# Patient Record
Sex: MALE | Race: WHITE | Employment: UNEMPLOYED | URBAN - METROPOLITAN AREA
[De-identification: names, ages, dates, MRNs, and addresses within clinical notes are randomized per-mention and may not be internally consistent; named-entity substitution may affect disease eponyms.]

---

## 2021-04-13 NOTE — PRE-PROCEDURE INSTRUCTIONS
Pre-procedure phone call was made to patient and there was no answer. Detailed message was left for patient.   [phone]

## 2021-04-19 ENCOUNTER — ANESTHESIA (OUTPATIENT)
Dept: ENDOSCOPY | Age: 61
End: 2021-04-19
Payer: MEDICARE

## 2021-04-19 ENCOUNTER — HOSPITAL ENCOUNTER (OUTPATIENT)
Age: 61
Setting detail: OUTPATIENT SURGERY
Discharge: HOME | End: 2021-04-19
Attending: INTERNAL MEDICINE | Admitting: INTERNAL MEDICINE
Payer: MEDICARE

## 2021-04-19 ENCOUNTER — ANESTHESIA EVENT (OUTPATIENT)
Dept: ENDOSCOPY | Age: 61
End: 2021-04-19
Payer: MEDICARE

## 2021-04-19 VITALS
RESPIRATION RATE: 16 BRPM | SYSTOLIC BLOOD PRESSURE: 131 MMHG | DIASTOLIC BLOOD PRESSURE: 77 MMHG | HEART RATE: 64 BPM | OXYGEN SATURATION: 95 % | TEMPERATURE: 98 F

## 2021-04-19 VITALS — OXYGEN SATURATION: 100 % | DIASTOLIC BLOOD PRESSURE: 75 MMHG | SYSTOLIC BLOOD PRESSURE: 92 MMHG

## 2021-04-19 LAB
GLUCOSE BLD-MCNC: 300 MG/DL (ref 70–99)
PERFORMED ON: ABNORMAL
SARS-COV-2, NAAT: NOT DETECTED

## 2021-04-19 PROCEDURE — 7100000010 HC PHASE II RECOVERY - FIRST 15 MIN: Performed by: INTERNAL MEDICINE

## 2021-04-19 PROCEDURE — 2500000003 HC RX 250 WO HCPCS: Performed by: NURSE ANESTHETIST, CERTIFIED REGISTERED

## 2021-04-19 PROCEDURE — 3609012400 HC EGD TRANSORAL BIOPSY SINGLE/MULTIPLE: Performed by: INTERNAL MEDICINE

## 2021-04-19 PROCEDURE — 88305 TISSUE EXAM BY PATHOLOGIST: CPT

## 2021-04-19 PROCEDURE — 7100000011 HC PHASE II RECOVERY - ADDTL 15 MIN: Performed by: INTERNAL MEDICINE

## 2021-04-19 PROCEDURE — 2580000003 HC RX 258: Performed by: ANESTHESIOLOGY

## 2021-04-19 PROCEDURE — 2709999900 HC NON-CHARGEABLE SUPPLY: Performed by: INTERNAL MEDICINE

## 2021-04-19 PROCEDURE — 3700000000 HC ANESTHESIA ATTENDED CARE: Performed by: INTERNAL MEDICINE

## 2021-04-19 PROCEDURE — 3609008400 HC SIGMOIDOSCOPY DIAGNOSTIC: Performed by: INTERNAL MEDICINE

## 2021-04-19 PROCEDURE — 87635 SARS-COV-2 COVID-19 AMP PRB: CPT

## 2021-04-19 PROCEDURE — 3700000001 HC ADD 15 MINUTES (ANESTHESIA): Performed by: INTERNAL MEDICINE

## 2021-04-19 PROCEDURE — 6360000002 HC RX W HCPCS: Performed by: NURSE ANESTHETIST, CERTIFIED REGISTERED

## 2021-04-19 RX ORDER — SODIUM CHLORIDE 9 MG/ML
INJECTION, SOLUTION INTRAVENOUS CONTINUOUS
Status: DISCONTINUED | OUTPATIENT
Start: 2021-04-19 | End: 2021-04-19 | Stop reason: HOSPADM

## 2021-04-19 RX ORDER — ACETAMINOPHEN 500 MG
1000 TABLET ORAL EVERY 12 HOURS PRN
COMMUNITY

## 2021-04-19 RX ORDER — SPIRONOLACTONE 25 MG/1
25 TABLET ORAL DAILY
COMMUNITY

## 2021-04-19 RX ORDER — GLYCOPYRROLATE 0.2 MG/ML
INJECTION INTRAMUSCULAR; INTRAVENOUS PRN
Status: DISCONTINUED | OUTPATIENT
Start: 2021-04-19 | End: 2021-04-19 | Stop reason: SDUPTHER

## 2021-04-19 RX ORDER — NICOTINE 21 MG/24HR
1 PATCH, TRANSDERMAL 24 HOURS TRANSDERMAL EVERY 24 HOURS
Status: ON HOLD | COMMUNITY
End: 2022-06-19

## 2021-04-19 RX ORDER — NITROGLYCERIN 0.4 MG/1
0.4 TABLET SUBLINGUAL EVERY 5 MIN PRN
Status: ON HOLD | COMMUNITY
End: 2022-06-19

## 2021-04-19 RX ORDER — DULOXETIN HYDROCHLORIDE 30 MG/1
30 CAPSULE, DELAYED RELEASE ORAL 2 TIMES DAILY
COMMUNITY

## 2021-04-19 RX ORDER — FAMOTIDINE 40 MG/1
40 TABLET, FILM COATED ORAL DAILY
Status: ON HOLD | COMMUNITY
End: 2022-06-19

## 2021-04-19 RX ORDER — ATORVASTATIN CALCIUM 80 MG/1
80 TABLET, FILM COATED ORAL NIGHTLY
COMMUNITY

## 2021-04-19 RX ORDER — TADALAFIL 5 MG/1
5 TABLET ORAL PRN
COMMUNITY

## 2021-04-19 RX ORDER — FINASTERIDE 5 MG/1
5 TABLET, FILM COATED ORAL DAILY
Status: ON HOLD | COMMUNITY
End: 2022-06-19

## 2021-04-19 RX ORDER — PROPOFOL 10 MG/ML
INJECTION, EMULSION INTRAVENOUS PRN
Status: DISCONTINUED | OUTPATIENT
Start: 2021-04-19 | End: 2021-04-19 | Stop reason: SDUPTHER

## 2021-04-19 RX ORDER — CLOPIDOGREL BISULFATE 75 MG/1
75 TABLET ORAL DAILY
COMMUNITY

## 2021-04-19 RX ORDER — PREGABALIN 150 MG/1
150 CAPSULE ORAL 2 TIMES DAILY
COMMUNITY

## 2021-04-19 RX ORDER — ASPIRIN 81 MG/1
81 TABLET ORAL DAILY
COMMUNITY

## 2021-04-19 RX ORDER — METOPROLOL SUCCINATE 50 MG/1
50 TABLET, EXTENDED RELEASE ORAL DAILY
COMMUNITY

## 2021-04-19 RX ORDER — LISINOPRIL AND HYDROCHLOROTHIAZIDE 25; 20 MG/1; MG/1
1 TABLET ORAL DAILY
Status: ON HOLD | COMMUNITY
End: 2022-06-19

## 2021-04-19 RX ORDER — TERBINAFINE HYDROCHLORIDE 250 MG/1
250 TABLET ORAL DAILY
Status: ON HOLD | COMMUNITY
End: 2022-06-19

## 2021-04-19 RX ORDER — LIDOCAINE HYDROCHLORIDE 20 MG/ML
INJECTION, SOLUTION EPIDURAL; INFILTRATION; INTRACAUDAL; PERINEURAL PRN
Status: DISCONTINUED | OUTPATIENT
Start: 2021-04-19 | End: 2021-04-19 | Stop reason: SDUPTHER

## 2021-04-19 RX ADMIN — PROPOFOL 50 MG: 10 INJECTION, EMULSION INTRAVENOUS at 12:18

## 2021-04-19 RX ADMIN — PROPOFOL 30 MG: 10 INJECTION, EMULSION INTRAVENOUS at 12:28

## 2021-04-19 RX ADMIN — SODIUM CHLORIDE: 9 INJECTION, SOLUTION INTRAVENOUS at 12:14

## 2021-04-19 RX ADMIN — PHENYLEPHRINE HYDROCHLORIDE 100 MCG: 10 INJECTION INTRAVENOUS at 12:26

## 2021-04-19 RX ADMIN — LIDOCAINE HYDROCHLORIDE 80 MG: 20 INJECTION, SOLUTION EPIDURAL; INFILTRATION; INTRACAUDAL; PERINEURAL at 12:18

## 2021-04-19 RX ADMIN — GLYCOPYRROLATE 0.2 MG: 0.2 INJECTION INTRAMUSCULAR; INTRAVENOUS at 12:18

## 2021-04-19 RX ADMIN — PROPOFOL 30 MG: 10 INJECTION, EMULSION INTRAVENOUS at 12:21

## 2021-04-19 RX ADMIN — SODIUM CHLORIDE: 9 INJECTION, SOLUTION INTRAVENOUS at 11:55

## 2021-04-19 RX ADMIN — SODIUM CHLORIDE: 9 INJECTION, SOLUTION INTRAVENOUS at 12:06

## 2021-04-19 RX ADMIN — PHENYLEPHRINE HYDROCHLORIDE 100 MCG: 10 INJECTION INTRAVENOUS at 12:30

## 2021-04-19 RX ADMIN — PROPOFOL 30 MG: 10 INJECTION, EMULSION INTRAVENOUS at 12:24

## 2021-04-19 ASSESSMENT — PAIN SCALES - GENERAL
PAINLEVEL_OUTOF10: 0

## 2021-04-19 ASSESSMENT — LIFESTYLE VARIABLES: SMOKING_STATUS: 1

## 2021-04-19 NOTE — H&P
Gastroenterology Note                 Pre-operative History and Physical    Patient: Wolfgang Guerin  : 1960  CSN:     History Obtained From:   Patient or guardian. HISTORY OF PRESENT ILLNESS:    The patient is a 63 y.o. male here for Endoscopy. Past Medical History:    Past Medical History:   Diagnosis Date    CAD (coronary artery disease)     [facility]     Empyema [facility]     GERD (gastroesophageal reflux disease)     Hyperlipidemia     Hypertension     Low back pain     [facility]myocardial infarction) [facility]     PVD (peripheral vascular disease) [facility]     Sleep apnea     TIA (transient ischemic attack)      Past Surgical History:    Past Surgical History:   Procedure Laterality Date    BACK INJECTION      CORONARY ARTERY BYPASS GRAFT      OTHER SURGICAL HISTORY      right leg vascular stents    TOE AMPUTATION Right      Medications Prior to Admission:   No current facility-administered medications on file prior to encounter. Current Outpatient Medications on File Prior to Encounter   Medication Sig Dispense Refill    lisinopril-hydroCHLOROthiazide (ZESTORETIC) 20-25 MG per tablet Take 1 tablet by mouth daily      metoprolol succinate (TOPROL XL) 100 MG extended release tablet Take 100 mg by mouth daily      pregabalin (LYRICA) 150 MG capsule Take 300 mg by mouth 2 times daily.       spironolactone (ALDACTONE) 25 MG tablet Take 25 mg by mouth daily      tadalafil (CIALIS) 5 MG tablet Take 5 mg by mouth as needed for Erectile Dysfunction      terbinafine (LAMISIL) 250 MG tablet Take 250 mg by mouth daily      diphenhydrAMINE HCl (BENADRYL PO) Take by mouth      acetaminophen (TYLENOL) 500 MG tablet Take 500 mg by mouth every 6 hours as needed for Pain      aspirin 81 MG EC tablet Take 81 mg by mouth daily      atorvastatin (LIPITOR) 80 MG tablet Take 80 mg by mouth daily      DULoxetine (CYMBALTA) 30 MG extended release capsule Take 30 mg by mouth daily      famotidine (PEPCID) 40 MG tablet Take 40 mg by mouth daily      finasteride (PROSCAR) 5 MG tablet Take 5 mg by mouth daily      Insulin Aspart Prot & Aspart (NOVOLOG MIX 70/30 FLEXPEN SC) Inject 10 Units into the skin 3 times daily Plus sliding scale      nicotine (NICODERM CQ) 21 MG/24HR Place 1 patch onto the skin every 24 hours      nitroGLYCERIN (NITROSTAT) 0.4 MG SL tablet Place 0.4 mg under the tongue every 5 minutes as needed for Chest pain up to max of 3 total doses. If no relief after 1 dose, call 911.  clopidogrel (PLAVIX) 75 MG tablet Take 75 mg by mouth daily          Allergies:  Amiodarone and Bactrim [sulfamethoxazole-trimethoprim]      Social History:   Social History     Tobacco Use    Smoking status: Current Every Day Smoker     Packs/day: 2.00     Years: 40.00     Pack years: 80.00    Smokeless tobacco: Never Used   Substance Use Topics    Alcohol use: Not Currently     Family History:   History reviewed. No pertinent family history. PHYSICAL EXAM:      There were no vitals taken for this visit. I        Heart:  RRR, normal s1s2    Lungs:  CTA and normal effort    Abdomen:   Soft, nt nd. ASSESSMENT AND PLAN:    1. Patient is a 63 y.o. male here for endoscopy with MAC sedation. 2.  Procedure options, risks and benefits reviewed with patient and/or guardian. They express understanding.

## 2021-04-19 NOTE — PROGRESS NOTES
Patient transported via wheelchair to lobby with spouse at side to wait for transportation home. Spouse has been on hold approximately 45 minutes waiting to make reservation for transportation home. Spouse will continue to try to contact transportation from waiting room. All patient care complete per protocol. Patient a/o x 4. No distress.

## 2021-04-19 NOTE — ANESTHESIA PRE PROCEDURE
Department of Anesthesiology  Preprocedure Note       Name:  Nicolas Francois   Age:  63 y.o.  :  1960                                          MRN:  [id number]         Date:  2021      Surgeon: Jelani Hoskins):  Sin Carlin MD    Procedure: Procedure(s):  COLONOSCOPY DIAGNOSTIC  EGD DIAGNOSTIC ONLY    Medications prior to admission:   Prior to Admission medications    Medication Sig Start Date End Date Taking? Authorizing Provider   lisinopril-hydroCHLOROthiazide (ZESTORETIC) 20-25 MG per tablet Take 1 tablet by mouth daily   Yes Historical Provider, MD   metoprolol succinate (TOPROL XL) 100 MG extended release tablet Take 100 mg by mouth daily   Yes Historical Provider, MD   pregabalin (LYRICA) 150 MG capsule Take 300 mg by mouth 2 times daily.    Yes Historical Provider, MD   spironolactone (ALDACTONE) 25 MG tablet Take 25 mg by mouth daily   Yes Historical Provider, MD   tadalafil (CIALIS) 5 MG tablet Take 5 mg by mouth as needed for Erectile Dysfunction   Yes Historical Provider, MD   terbinafine (LAMISIL) 250 MG tablet Take 250 mg by mouth daily   Yes Historical Provider, MD   diphenhydrAMINE HCl (BENADRYL PO) Take by mouth   Yes Historical Provider, MD   acetaminophen (TYLENOL) 500 MG tablet Take 500 mg by mouth every 6 hours as needed for Pain   Yes Historical Provider, MD   aspirin 81 MG EC tablet Take 81 mg by mouth daily   Yes Historical Provider, MD   atorvastatin (LIPITOR) 80 MG tablet Take 80 mg by mouth daily   Yes Historical Provider, MD   DULoxetine (CYMBALTA) 30 MG extended release capsule Take 30 mg by mouth daily   Yes Historical Provider, MD   famotidine (PEPCID) 40 MG tablet Take 40 mg by mouth daily   Yes Historical Provider, MD   finasteride (PROSCAR) 5 MG tablet Take 5 mg by mouth daily   Yes Historical Provider, MD   Insulin Aspart Prot & Aspart (NOVOLOG MIX 70/30 FLEXPEN SC) Inject 10 Units into the skin 3 times daily Plus sliding scale    Historical Provider, MD   nicotine (NICODERM CQ) 21 MG/24HR Place 1 patch onto the skin every 24 hours    Historical Provider, MD   nitroGLYCERIN (NITROSTAT) 0.4 MG SL tablet Place 0.4 mg under the tongue every 5 minutes as needed for Chest pain up to max of 3 total doses. If no relief after 1 dose, call 911. Historical Provider, MD   clopidogrel (PLAVIX) 75 MG tablet Take 75 mg by mouth daily    Historical Provider, MD       Current medications:    Current Facility-Administered Medications   Medication Dose Route Frequency Provider Last Rate Last Admin    0.9 % sodium chloride infusion   Intravenous Continuous Bolivar Gordon MD   New Bag at 04/19/21 1155       Allergies: Allergies   Allergen Reactions    Amiodarone     Bactrim [Sulfamethoxazole-Trimethoprim] Hives       Problem List:  There is no problem list on file for this patient. Past Medical History:  No past medical history on file. Past Surgical History:  No past surgical history on file. Social History:    Social History     Tobacco Use    Smoking status: Not on file   Substance Use Topics    Alcohol use: Not on file                                Counseling given: Not Answered      Vital Signs (Current): There were no vitals filed for this visit. BP Readings from Last 3 Encounters:   No data found for BP       NPO Status:                                                                                 BMI:   Wt Readings from Last 3 Encounters:   No data found for Wt     There is no height or weight on file to calculate BMI.    CBC: No results found for: WBC, RBC, HGB, HCT, MCV, RDW, PLT    CMP: No results found for: NA, K, CL, CO2, BUN, CREATININE, GFRAA, AGRATIO, LABGLOM, GLUCOSE, PROT, CALCIUM, BILITOT, ALKPHOS, AST, ALT    POC Tests: No results for input(s): POCGLU, POCNA, POCK, POCCL, POCBUN, POCHEMO, POCHCT in the last 72 hours.     Coags: No results found for: PROTIME, INR, APTT    HCG (If Applicable): No results found for: PREGTESTUR, PREGSERUM, HCG, HCGQUANT     ABGs: No results found for: PHART, PO2ART, BWG1NEL, GTN2EYH, BEART, V5JLYBTI     Type & Screen (If Applicable):  No results found for: LABABO, LABRH    Drug/Infectious Status (If Applicable):  No results found for: HIV, HEPCAB    COVID-19 Screening (If Applicable): No results found for: COVID19        Anesthesia Evaluation  Patient summary reviewed   history of anesthetic complications: PONV. Airway: Mallampati: II  TM distance: >3 FB   Neck ROM: full  Mouth opening: > = 3 FB Dental:    (+) upper dentures and lower dentures      Pulmonary: breath sounds clear to auscultation  (+) sleep apnea: on CPAP,  current smoker                           Cardiovascular:    (+) hypertension:, CABG/stent (CABG):, hyperlipidemia    (-)  angina      Rhythm: regular  Rate: normal                 ROS comment: SUMMARY:    - LAD: Mid-vessel lesion: There is a 100% stenosis. - Left circumflex: Mid-vessel lesion: There is a 100% stenosis. - Right coronary: Proximal vessel lesion: There is a 100% stenosis. IMPRESSIONS:  1. Severe native three-vessel coronary artery disease  2. Patent LIMA-LAD; patent SVG-OM 2; patent SVG-RPDA; presumed  occluded SVG-OM1.  RECOMMENDATIONS:  Patient management should include medical  therapy.    -------------------------------------------------------------------  HISTORY:   Chest pain.  Unstable angina.  Functional status:   CCS  class III (marked limitation of ordinary activity     Neuro/Psych:   (+) CVA (residual slurred speech, left arm weakness):, TIA,    (-) seizures            ROS comment: Walks with cane GI/Hepatic/Renal:   (+) GERD:,          ROS comment: No gerd symptoms at present. No n/v at present. Endo/Other:    (+) Diabetes, . Abdominal:           Vascular:   + PVD, aortic or cerebral, .                                    Anesthesia Plan      MAC     ASA 3     (Patient verbalizes understanding that there is the possibility

## 2021-04-19 NOTE — ANESTHESIA POSTPROCEDURE EVALUATION
Department of Anesthesiology  Postprocedure Note    Patient: Marc Loya  MRN: [id number]  YOB: 1960  Date of evaluation: 4/19/2021  Time:  12:51 PM     Procedure Summary     Date: 04/19/21 Room / Location: [address] [place] [address]    Anesthesia Start: 1214 Anesthesia Stop: 6283    Procedures:       [address] [facility] (N/A )      EGD BIOPSY (N/A Abdomen) Diagnosis:       (COLON CANCER SCREENING, NO SYMPTOMS Z12.11)      (GASTRIC REFLUX K21.9)    Surgeons: Roro Pollard MD Responsible Provider: Lala Mckenna MD    Anesthesia Type: MAC ASA Status: 3          Anesthesia Type: MAC    Louis Phase I: Louis Score: 10    Louis Phase II:      Last vitals: Reviewed and per EMR flowsheets.        Anesthesia Post Evaluation    Patient location during evaluation: PACU  Level of consciousness: awake  Airway patency: patent  Complications: no  Cardiovascular status: hemodynamically stable  Respiratory status: acceptable

## 2022-06-18 ENCOUNTER — APPOINTMENT (OUTPATIENT)
Dept: CT IMAGING | Age: 62
DRG: 638 | End: 2022-06-18
Payer: MEDICARE

## 2022-06-18 ENCOUNTER — HOSPITAL ENCOUNTER (INPATIENT)
Age: 62
LOS: 5 days | Discharge: HOME | DRG: 638 | End: 2022-06-24
Attending: EMERGENCY MEDICINE | Admitting: INTERNAL MEDICINE
Payer: MEDICARE

## 2022-06-18 DIAGNOSIS — S22.41XA CLOSED FRACTURE OF MULTIPLE RIBS OF RIGHT SIDE, INITIAL ENCOUNTER: ICD-10-CM

## 2022-06-18 DIAGNOSIS — S90.424A: ICD-10-CM

## 2022-06-18 DIAGNOSIS — L08.9: ICD-10-CM

## 2022-06-18 DIAGNOSIS — S78.112A AMPUTATION OF LEFT LOWER EXTREMITY ABOVE KNEE UPON EXAMINATION (HCC): ICD-10-CM

## 2022-06-18 DIAGNOSIS — R62.7 FTT (FAILURE TO THRIVE) IN ADULT: Primary | ICD-10-CM

## 2022-06-18 DIAGNOSIS — R29.6 RECURRENT FALLS: ICD-10-CM

## 2022-06-18 LAB
ALBUMIN SERPL-MCNC: 3.9 G/DL (ref 3.4–5)
ALP BLD-CCNC: 264 U/L (ref 40–129)
ALT SERPL-CCNC: 10 U/L (ref 10–40)
AMMONIA: 22 UMOL/L (ref 16–60)
ANION GAP SERPL CALCULATED.3IONS-SCNC: 12 MMOL/L (ref 3–16)
AST SERPL-CCNC: 13 U/L (ref 15–37)
BASE EXCESS VENOUS: -0.8 MMOL/L (ref -3–3)
BASOPHILS ABSOLUTE: 0.1 K/UL (ref 0–0.2)
BASOPHILS RELATIVE PERCENT: 0.7 %
BETA-HYDROXYBUTYRATE: 0.1 MMOL/L (ref 0–0.27)
BILIRUB SERPL-MCNC: 0.3 MG/DL (ref 0–1)
BILIRUBIN DIRECT: <0.2 MG/DL (ref 0–0.3)
BILIRUBIN, INDIRECT: ABNORMAL MG/DL (ref 0–1)
BUN BLDV-MCNC: 18 MG/DL (ref 7–20)
CALCIUM SERPL-MCNC: 10.2 MG/DL (ref 8.3–10.6)
CARBOXYHEMOGLOBIN: 1.6 % (ref 0–1.5)
CHLORIDE BLD-SCNC: 100 MMOL/L (ref 99–110)
CO2: 25 MMOL/L (ref 21–32)
CREAT SERPL-MCNC: 0.8 MG/DL (ref 0.8–1.3)
EOSINOPHILS ABSOLUTE: 1 K/UL (ref 0–0.6)
EOSINOPHILS RELATIVE PERCENT: 8.1 %
GFR AFRICAN AMERICAN: >60
GFR NON-AFRICAN AMERICAN: >60
GLUCOSE BLD-MCNC: 413 MG/DL (ref 70–99)
GLUCOSE BLD-MCNC: 456 MG/DL (ref 70–99)
HCO3 VENOUS: 24 MMOL/L (ref 23–29)
HCT VFR BLD CALC: 41.3 % (ref 40.5–52.5)
HEMOGLOBIN: 13.4 G/DL (ref 13.5–17.5)
INR BLD: 0.95 (ref 0.87–1.14)
LACTIC ACID: 2.2 MMOL/L (ref 0.4–2)
LYMPHOCYTES ABSOLUTE: 1.7 K/UL (ref 1–5.1)
LYMPHOCYTES RELATIVE PERCENT: 13.9 %
MCH RBC QN AUTO: 27.2 PG (ref 26–34)
MCHC RBC AUTO-ENTMCNC: 32.4 G/DL (ref 31–36)
MCV RBC AUTO: 83.9 FL (ref 80–100)
METHEMOGLOBIN VENOUS: 0.6 %
MONOCYTES ABSOLUTE: 0.8 K/UL (ref 0–1.3)
MONOCYTES RELATIVE PERCENT: 7 %
NEUTROPHILS ABSOLUTE: 8.6 K/UL (ref 1.7–7.7)
NEUTROPHILS RELATIVE PERCENT: 70.3 %
O2 CONTENT, VEN: 18 VOL %
O2 SAT, VEN: 99 %
O2 THERAPY: ABNORMAL
PCO2, VEN: 39.3 MMHG (ref 40–50)
PDW BLD-RTO: 16.8 % (ref 12.4–15.4)
PERFORMED ON: ABNORMAL
PH VENOUS: 7.39 (ref 7.35–7.45)
PLATELET # BLD: 470 K/UL (ref 135–450)
PMV BLD AUTO: 8.3 FL (ref 5–10.5)
PO2, VEN: 160 MMHG (ref 25–40)
POTASSIUM REFLEX MAGNESIUM: 4.8 MMOL/L (ref 3.5–5.1)
PRO-BNP: 819 PG/ML (ref 0–124)
PROTHROMBIN TIME: 12.6 SEC (ref 11.7–14.5)
RBC # BLD: 4.93 M/UL (ref 4.2–5.9)
SODIUM BLD-SCNC: 137 MMOL/L (ref 136–145)
TCO2 CALC VENOUS: 56 MMOL/L
TOTAL PROTEIN: 7.8 G/DL (ref 6.4–8.2)
TROPONIN: 0.03 NG/ML
WBC # BLD: 12.2 K/UL (ref 4–11)

## 2022-06-18 PROCEDURE — 82140 ASSAY OF AMMONIA: CPT

## 2022-06-18 PROCEDURE — 83880 ASSAY OF NATRIURETIC PEPTIDE: CPT

## 2022-06-18 PROCEDURE — 96374 THER/PROPH/DIAG INJ IV PUSH: CPT

## 2022-06-18 PROCEDURE — 82803 BLOOD GASES ANY COMBINATION: CPT

## 2022-06-18 PROCEDURE — 85610 PROTHROMBIN TIME: CPT

## 2022-06-18 PROCEDURE — 83605 ASSAY OF LACTIC ACID: CPT

## 2022-06-18 PROCEDURE — 80048 BASIC METABOLIC PNL TOTAL CA: CPT

## 2022-06-18 PROCEDURE — 93005 ELECTROCARDIOGRAM TRACING: CPT | Performed by: EMERGENCY MEDICINE

## 2022-06-18 PROCEDURE — 74176 CT ABD & PELVIS W/O CONTRAST: CPT

## 2022-06-18 PROCEDURE — 85025 COMPLETE CBC W/AUTO DIFF WBC: CPT

## 2022-06-18 PROCEDURE — 84484 ASSAY OF TROPONIN QUANT: CPT

## 2022-06-18 PROCEDURE — 99285 EMERGENCY DEPT VISIT HI MDM: CPT

## 2022-06-18 PROCEDURE — 80076 HEPATIC FUNCTION PANEL: CPT

## 2022-06-18 PROCEDURE — 82010 KETONE BODYS QUAN: CPT

## 2022-06-18 PROCEDURE — 2580000003 HC RX 258: Performed by: EMERGENCY MEDICINE

## 2022-06-18 PROCEDURE — 36415 COLL VENOUS BLD VENIPUNCTURE: CPT

## 2022-06-18 PROCEDURE — 70450 CT HEAD/BRAIN W/O DYE: CPT

## 2022-06-18 RX ORDER — FENTANYL CITRATE 50 UG/ML
100 INJECTION, SOLUTION INTRAMUSCULAR; INTRAVENOUS ONCE
Status: COMPLETED | OUTPATIENT
Start: 2022-06-18 | End: 2022-06-19

## 2022-06-18 RX ORDER — SODIUM CHLORIDE, SODIUM LACTATE, POTASSIUM CHLORIDE, CALCIUM CHLORIDE 600; 310; 30; 20 MG/100ML; MG/100ML; MG/100ML; MG/100ML
1000 INJECTION, SOLUTION INTRAVENOUS ONCE
Status: COMPLETED | OUTPATIENT
Start: 2022-06-18 | End: 2022-06-18

## 2022-06-18 RX ADMIN — SODIUM CHLORIDE, POTASSIUM CHLORIDE, SODIUM LACTATE AND CALCIUM CHLORIDE 1000 ML: 600; 310; 30; 20 INJECTION, SOLUTION INTRAVENOUS at 23:12

## 2022-06-19 ENCOUNTER — APPOINTMENT (OUTPATIENT)
Dept: GENERAL RADIOLOGY | Age: 62
DRG: 638 | End: 2022-06-19
Payer: MEDICARE

## 2022-06-19 PROBLEM — L08.9 TOE INFECTION: Status: ACTIVE | Noted: 2022-06-19

## 2022-06-19 PROBLEM — S22.41XD CLOSED FRACTURE OF MULTIPLE RIBS OF RIGHT SIDE WITH ROUTINE HEALING: Status: ACTIVE | Noted: 2022-06-19

## 2022-06-19 PROBLEM — R29.6 FREQUENT FALLS: Status: ACTIVE | Noted: 2022-06-19

## 2022-06-19 PROBLEM — S91.101A OPEN WOUND OF RIGHT GREAT TOE: Status: ACTIVE | Noted: 2022-06-19

## 2022-06-19 PROBLEM — Z89.612 STATUS POST ABOVE KNEE AMPUTATION, LEFT (HCC): Status: ACTIVE | Noted: 2022-06-19

## 2022-06-19 LAB
EKG ATRIAL RATE: 108 BPM
EKG DIAGNOSIS: NORMAL
EKG P AXIS: 48 DEGREES
EKG P-R INTERVAL: 134 MS
EKG Q-T INTERVAL: 352 MS
EKG QRS DURATION: 102 MS
EKG QTC CALCULATION (BAZETT): 471 MS
EKG R AXIS: -33 DEGREES
EKG T AXIS: 74 DEGREES
EKG VENTRICULAR RATE: 108 BPM
GLUCOSE BLD-MCNC: 198 MG/DL (ref 70–99)
GLUCOSE BLD-MCNC: 228 MG/DL (ref 70–99)
GLUCOSE BLD-MCNC: 270 MG/DL (ref 70–99)
GLUCOSE BLD-MCNC: 82 MG/DL (ref 70–99)
LACTIC ACID: 1.8 MMOL/L (ref 0.4–2)
PERFORMED ON: ABNORMAL
PERFORMED ON: NORMAL

## 2022-06-19 PROCEDURE — 6360000002 HC RX W HCPCS: Performed by: INTERNAL MEDICINE

## 2022-06-19 PROCEDURE — 6370000000 HC RX 637 (ALT 250 FOR IP): Performed by: INTERNAL MEDICINE

## 2022-06-19 PROCEDURE — 93010 ELECTROCARDIOGRAM REPORT: CPT | Performed by: INTERNAL MEDICINE

## 2022-06-19 PROCEDURE — 87040 BLOOD CULTURE FOR BACTERIA: CPT

## 2022-06-19 PROCEDURE — 6370000000 HC RX 637 (ALT 250 FOR IP): Performed by: PHYSICIAN ASSISTANT

## 2022-06-19 PROCEDURE — 73620 X-RAY EXAM OF FOOT: CPT

## 2022-06-19 PROCEDURE — 2580000003 HC RX 258: Performed by: INTERNAL MEDICINE

## 2022-06-19 PROCEDURE — 6360000002 HC RX W HCPCS: Performed by: EMERGENCY MEDICINE

## 2022-06-19 PROCEDURE — 6370000000 HC RX 637 (ALT 250 FOR IP): Performed by: HOSPITALIST

## 2022-06-19 PROCEDURE — 1200000000 HC SEMI PRIVATE

## 2022-06-19 PROCEDURE — 36415 COLL VENOUS BLD VENIPUNCTURE: CPT

## 2022-06-19 PROCEDURE — 83605 ASSAY OF LACTIC ACID: CPT

## 2022-06-19 PROCEDURE — 6370000000 HC RX 637 (ALT 250 FOR IP): Performed by: PODIATRIST

## 2022-06-19 RX ORDER — CLOPIDOGREL BISULFATE 75 MG/1
75 TABLET ORAL DAILY
Status: DISCONTINUED | OUTPATIENT
Start: 2022-06-19 | End: 2022-06-24 | Stop reason: HOSPADM

## 2022-06-19 RX ORDER — ASPIRIN 81 MG/1
81 TABLET ORAL DAILY
Status: DISCONTINUED | OUTPATIENT
Start: 2022-06-19 | End: 2022-06-24 | Stop reason: HOSPADM

## 2022-06-19 RX ORDER — DULOXETIN HYDROCHLORIDE 30 MG/1
30 CAPSULE, DELAYED RELEASE ORAL 2 TIMES DAILY
Status: DISCONTINUED | OUTPATIENT
Start: 2022-06-19 | End: 2022-06-24 | Stop reason: HOSPADM

## 2022-06-19 RX ORDER — ACETAMINOPHEN 650 MG/1
650 SUPPOSITORY RECTAL EVERY 6 HOURS PRN
Status: DISCONTINUED | OUTPATIENT
Start: 2022-06-19 | End: 2022-06-24 | Stop reason: HOSPADM

## 2022-06-19 RX ORDER — SODIUM CHLORIDE 0.9 % (FLUSH) 0.9 %
5-40 SYRINGE (ML) INJECTION EVERY 12 HOURS SCHEDULED
Status: DISCONTINUED | OUTPATIENT
Start: 2022-06-19 | End: 2022-06-24 | Stop reason: HOSPADM

## 2022-06-19 RX ORDER — NICOTINE 21 MG/24HR
1 PATCH, TRANSDERMAL 24 HOURS TRANSDERMAL DAILY
Status: DISCONTINUED | OUTPATIENT
Start: 2022-06-19 | End: 2022-06-24 | Stop reason: HOSPADM

## 2022-06-19 RX ORDER — PREGABALIN 75 MG/1
150 CAPSULE ORAL 2 TIMES DAILY
Status: DISCONTINUED | OUTPATIENT
Start: 2022-06-19 | End: 2022-06-24 | Stop reason: HOSPADM

## 2022-06-19 RX ORDER — POLYETHYLENE GLYCOL 3350 17 G/17G
17 POWDER, FOR SOLUTION ORAL DAILY PRN
Status: DISCONTINUED | OUTPATIENT
Start: 2022-06-19 | End: 2022-06-24 | Stop reason: HOSPADM

## 2022-06-19 RX ORDER — LOSARTAN POTASSIUM 50 MG/1
50 TABLET ORAL 2 TIMES DAILY
COMMUNITY

## 2022-06-19 RX ORDER — INSULIN LISPRO 100 [IU]/ML
0-12 INJECTION, SOLUTION INTRAVENOUS; SUBCUTANEOUS
Status: DISCONTINUED | OUTPATIENT
Start: 2022-06-19 | End: 2022-06-24 | Stop reason: HOSPADM

## 2022-06-19 RX ORDER — LISINOPRIL AND HYDROCHLOROTHIAZIDE 25; 20 MG/1; MG/1
1 TABLET ORAL DAILY
Status: DISCONTINUED | OUTPATIENT
Start: 2022-06-19 | End: 2022-06-19 | Stop reason: ALTCHOICE

## 2022-06-19 RX ORDER — OMEPRAZOLE 20 MG/1
20 CAPSULE, DELAYED RELEASE ORAL DAILY
COMMUNITY

## 2022-06-19 RX ORDER — SODIUM CHLORIDE 0.9 % (FLUSH) 0.9 %
5-40 SYRINGE (ML) INJECTION PRN
Status: DISCONTINUED | OUTPATIENT
Start: 2022-06-19 | End: 2022-06-24 | Stop reason: HOSPADM

## 2022-06-19 RX ORDER — LOSARTAN POTASSIUM 25 MG/1
50 TABLET ORAL 2 TIMES DAILY
Status: DISCONTINUED | OUTPATIENT
Start: 2022-06-19 | End: 2022-06-24 | Stop reason: HOSPADM

## 2022-06-19 RX ORDER — ONDANSETRON 2 MG/ML
4 INJECTION INTRAMUSCULAR; INTRAVENOUS EVERY 6 HOURS PRN
Status: DISCONTINUED | OUTPATIENT
Start: 2022-06-19 | End: 2022-06-24 | Stop reason: HOSPADM

## 2022-06-19 RX ORDER — ENOXAPARIN SODIUM 100 MG/ML
40 INJECTION SUBCUTANEOUS DAILY
Status: DISCONTINUED | OUTPATIENT
Start: 2022-06-19 | End: 2022-06-24 | Stop reason: HOSPADM

## 2022-06-19 RX ORDER — ACETAMINOPHEN 325 MG/1
650 TABLET ORAL EVERY 6 HOURS PRN
Status: DISCONTINUED | OUTPATIENT
Start: 2022-06-19 | End: 2022-06-24 | Stop reason: HOSPADM

## 2022-06-19 RX ORDER — HYDROCODONE BITARTRATE AND ACETAMINOPHEN 5; 325 MG/1; MG/1
1 TABLET ORAL EVERY 6 HOURS PRN
Status: DISCONTINUED | OUTPATIENT
Start: 2022-06-19 | End: 2022-06-21

## 2022-06-19 RX ORDER — INSULIN LISPRO 100 [IU]/ML
0-9 INJECTION, SOLUTION INTRAVENOUS; SUBCUTANEOUS
Status: DISCONTINUED | OUTPATIENT
Start: 2022-06-19 | End: 2022-06-19 | Stop reason: ALTCHOICE

## 2022-06-19 RX ORDER — SPIRONOLACTONE 25 MG/1
25 TABLET ORAL DAILY
Status: DISCONTINUED | OUTPATIENT
Start: 2022-06-19 | End: 2022-06-24 | Stop reason: HOSPADM

## 2022-06-19 RX ORDER — ATORVASTATIN CALCIUM 80 MG/1
80 TABLET, FILM COATED ORAL DAILY
Status: DISCONTINUED | OUTPATIENT
Start: 2022-06-19 | End: 2022-06-24 | Stop reason: HOSPADM

## 2022-06-19 RX ORDER — INSULIN GLARGINE 100 [IU]/ML
20 INJECTION, SOLUTION SUBCUTANEOUS NIGHTLY
COMMUNITY

## 2022-06-19 RX ORDER — METOPROLOL SUCCINATE 50 MG/1
50 TABLET, EXTENDED RELEASE ORAL DAILY
Status: DISCONTINUED | OUTPATIENT
Start: 2022-06-19 | End: 2022-06-24 | Stop reason: HOSPADM

## 2022-06-19 RX ORDER — OXYCODONE HYDROCHLORIDE AND ACETAMINOPHEN 5; 325 MG/1; MG/1
1 TABLET ORAL EVERY 4 HOURS PRN
Status: ON HOLD | COMMUNITY
End: 2022-06-23 | Stop reason: HOSPADM

## 2022-06-19 RX ORDER — ONDANSETRON 4 MG/1
4 TABLET, ORALLY DISINTEGRATING ORAL EVERY 8 HOURS PRN
Status: DISCONTINUED | OUTPATIENT
Start: 2022-06-19 | End: 2022-06-24 | Stop reason: HOSPADM

## 2022-06-19 RX ORDER — DEXTROSE MONOHYDRATE 50 MG/ML
100 INJECTION, SOLUTION INTRAVENOUS PRN
Status: DISCONTINUED | OUTPATIENT
Start: 2022-06-19 | End: 2022-06-24 | Stop reason: HOSPADM

## 2022-06-19 RX ORDER — METHOCARBAMOL 500 MG/1
500 TABLET, FILM COATED ORAL 3 TIMES DAILY PRN
COMMUNITY

## 2022-06-19 RX ORDER — INSULIN LISPRO 100 [IU]/ML
5 INJECTION, SOLUTION INTRAVENOUS; SUBCUTANEOUS
Status: DISCONTINUED | OUTPATIENT
Start: 2022-06-19 | End: 2022-06-24 | Stop reason: HOSPADM

## 2022-06-19 RX ORDER — FINASTERIDE 5 MG/1
5 TABLET, FILM COATED ORAL DAILY
Status: DISCONTINUED | OUTPATIENT
Start: 2022-06-19 | End: 2022-06-19 | Stop reason: ALTCHOICE

## 2022-06-19 RX ORDER — FAMOTIDINE 20 MG/1
40 TABLET, FILM COATED ORAL DAILY
Status: DISCONTINUED | OUTPATIENT
Start: 2022-06-19 | End: 2022-06-19 | Stop reason: ALTCHOICE

## 2022-06-19 RX ORDER — SODIUM CHLORIDE 9 MG/ML
INJECTION, SOLUTION INTRAVENOUS PRN
Status: DISCONTINUED | OUTPATIENT
Start: 2022-06-19 | End: 2022-06-24 | Stop reason: HOSPADM

## 2022-06-19 RX ORDER — INSULIN LISPRO 100 [IU]/ML
0-6 INJECTION, SOLUTION INTRAVENOUS; SUBCUTANEOUS NIGHTLY
Status: DISCONTINUED | OUTPATIENT
Start: 2022-06-19 | End: 2022-06-24 | Stop reason: HOSPADM

## 2022-06-19 RX ORDER — INSULIN GLARGINE 100 [IU]/ML
20 INJECTION, SOLUTION SUBCUTANEOUS NIGHTLY
Status: DISCONTINUED | OUTPATIENT
Start: 2022-06-19 | End: 2022-06-24 | Stop reason: HOSPADM

## 2022-06-19 RX ADMIN — CEFAZOLIN SODIUM 1000 MG: 1 INJECTION, POWDER, FOR SOLUTION INTRAMUSCULAR; INTRAVENOUS at 06:07

## 2022-06-19 RX ADMIN — ASPIRIN 81 MG: 81 TABLET, COATED ORAL at 10:15

## 2022-06-19 RX ADMIN — ACETAMINOPHEN 650 MG: 325 TABLET ORAL at 21:55

## 2022-06-19 RX ADMIN — CEFAZOLIN SODIUM 1000 MG: 1 INJECTION, POWDER, FOR SOLUTION INTRAMUSCULAR; INTRAVENOUS at 13:21

## 2022-06-19 RX ADMIN — DULOXETINE HYDROCHLORIDE 30 MG: 30 CAPSULE, DELAYED RELEASE ORAL at 21:56

## 2022-06-19 RX ADMIN — LOSARTAN POTASSIUM 50 MG: 25 TABLET, FILM COATED ORAL at 10:15

## 2022-06-19 RX ADMIN — Medication 10 ML: at 23:26

## 2022-06-19 RX ADMIN — PREGABALIN 150 MG: 75 CAPSULE ORAL at 10:14

## 2022-06-19 RX ADMIN — METOPROLOL SUCCINATE 50 MG: 50 TABLET, EXTENDED RELEASE ORAL at 10:15

## 2022-06-19 RX ADMIN — Medication 10 ML: at 10:15

## 2022-06-19 RX ADMIN — INSULIN GLARGINE 20 UNITS: 100 INJECTION, SOLUTION SUBCUTANEOUS at 22:00

## 2022-06-19 RX ADMIN — LOSARTAN POTASSIUM 50 MG: 25 TABLET, FILM COATED ORAL at 21:56

## 2022-06-19 RX ADMIN — HYDROCODONE BITARTRATE AND ACETAMINOPHEN 1 TABLET: 5; 325 TABLET ORAL at 16:43

## 2022-06-19 RX ADMIN — INSULIN LISPRO 5 UNITS: 100 INJECTION, SOLUTION INTRAVENOUS; SUBCUTANEOUS at 13:24

## 2022-06-19 RX ADMIN — ATORVASTATIN CALCIUM 80 MG: 80 TABLET, FILM COATED ORAL at 10:15

## 2022-06-19 RX ADMIN — INSULIN LISPRO 5 UNITS: 100 INJECTION, SOLUTION INTRAVENOUS; SUBCUTANEOUS at 08:50

## 2022-06-19 RX ADMIN — DULOXETINE HYDROCHLORIDE 30 MG: 30 CAPSULE, DELAYED RELEASE ORAL at 10:14

## 2022-06-19 RX ADMIN — FENTANYL CITRATE 100 MCG: 50 INJECTION, SOLUTION INTRAMUSCULAR; INTRAVENOUS at 01:37

## 2022-06-19 RX ADMIN — HYDROCODONE BITARTRATE AND ACETAMINOPHEN 1 TABLET: 5; 325 TABLET ORAL at 22:50

## 2022-06-19 RX ADMIN — INSULIN LISPRO 4 UNITS: 100 INJECTION, SOLUTION INTRAVENOUS; SUBCUTANEOUS at 13:24

## 2022-06-19 RX ADMIN — CEFAZOLIN SODIUM 1000 MG: 1 INJECTION, POWDER, FOR SOLUTION INTRAMUSCULAR; INTRAVENOUS at 22:12

## 2022-06-19 RX ADMIN — ENOXAPARIN SODIUM 40 MG: 100 INJECTION SUBCUTANEOUS at 10:15

## 2022-06-19 RX ADMIN — CLOPIDOGREL BISULFATE 75 MG: 75 TABLET ORAL at 10:14

## 2022-06-19 RX ADMIN — PREGABALIN 150 MG: 75 CAPSULE ORAL at 21:55

## 2022-06-19 RX ADMIN — MUPIROCIN: 20 OINTMENT TOPICAL at 16:52

## 2022-06-19 RX ADMIN — SPIRONOLACTONE 25 MG: 25 TABLET ORAL at 10:15

## 2022-06-19 ASSESSMENT — PAIN DESCRIPTION - LOCATION
LOCATION: BACK;LEG
LOCATION: LEG

## 2022-06-19 ASSESSMENT — PAIN DESCRIPTION - PAIN TYPE: TYPE: OTHER (COMMENT);CHRONIC PAIN

## 2022-06-19 ASSESSMENT — PAIN SCALES - GENERAL
PAINLEVEL_OUTOF10: 10
PAINLEVEL_OUTOF10: 10
PAINLEVEL_OUTOF10: 0
PAINLEVEL_OUTOF10: 2
PAINLEVEL_OUTOF10: 10
PAINLEVEL_OUTOF10: 7

## 2022-06-19 ASSESSMENT — PAIN DESCRIPTION - DESCRIPTORS: DESCRIPTORS: ACHING

## 2022-06-19 ASSESSMENT — PAIN DESCRIPTION - ORIENTATION: ORIENTATION: LEFT

## 2022-06-19 NOTE — PROGRESS NOTES
4 Eyes Skin Assessment     NAME:  Richa Rayo OF BIRTH:  1960  MEDICAL RECORD NUMBER:  [id number]    The patient is being assess for  Admission    I agree that 2 RN's have performed a thorough Head to Toe Skin Assessment on the patient. ALL assessment sites listed below have been assessed. Areas assessed by both nurses:    Head, Face, Ears, Shoulders, Back, Chest, Arms, Elbows, Hands, Sacrum. Buttock, Coccyx, Ischium and Legs. Feet and Heels        Does the Patient have a Wound? Yes wound(s) were present on assessment.  LDA wound assessment was Initiated and completed        Andre Prevention initiated:  Yes   Wound Care Orders initiated:  Yes    Pressure Injury (Stage 3,4, Unstageable, DTI, NWPT, and Complex wounds) if present place consult order under [de-identified] Yes    New and Established Ostomies if present place consult order under : NA      Nurse 1 eSignature: Electronically signed by Lourdes Olivares RN on 6/19/22 at 4:18 AM EDT    **SHARE this note so that the co-signing nurse is able to place an eSignature**    Nurse 2 eSignature: Electronically signed by Myla Romero RN on 6/19/22 at 5:57 AM EDT

## 2022-06-19 NOTE — CONSULTS
Pharmacy Medication History Note      List of current medications patient is taking is complete. Source of information: recent discharge med rec from [address] [place] [address] made to medication list:    Medications removed (include reason, ex. therapy complete or physician discontinued):  Pepcid  Finasteride  Novolog mix   Lisinoprol/HCTZ  Nicotine patch  Nitroglycerin  Terbinafine      Medications added/doses adjusted:  Atorvastatin - changed to nightly administration. Diphenhydramine - added dose of 25 mg q6 PRN  Duloxetine - dose increased to 30 mg BID  Metoprolol succinate - dose decreased to 50 mg daily. Lyrica - dose adjusted to 150 mg BID  Lantus - 20 units nightly  Lostartan - 50 mg BID  Methocarbamol - 500 mg TID PRN  Omeprazole 20 mg daily  Oxycodone-acetaminophen 5/325 - q4 PRN  Last dose times updated.      Jeremiah Bradford, PharmD, [facility]

## 2022-06-19 NOTE — H&P
Hospital Medicine History & Physical      PCP: No primary care provider on file. Date of Admission: 6/18/2022    Date of Service: Pt seen/examined on 6/19/2022  and Admitted to Inpatient with expected LOS greater than two midnights due to medical therapy. Chief Complaint:    Chief Complaint   Patient presents with    Fall     Patient in via [address] [facility] for multiple calls for falls this evening. Patient states he has lower back pain as well and side pain. History Of Present Illness: The patient is a 63 y.o. male with history of CAD, type 2 diabetes, emphysema, GERD, hyperlipidemia, hypertension, peripheral vascular disease who had a recent left AKA. He comes from home following recurrent falls and complaining of right chest discomfort. He fell multiple times today. Patient was recently discharged from the hospital and recommended for rehab however he declined. Today he wishes to go to rehab at discharge. No significant cough or production, no fevers or chills. He also has right great toe ulcer from recent injury. There is also right shin wound which is dressed. Patient not able to take care of self at home.   CT of the brain did not reveal acute pathology  CT of the chest abdomen pelvis noted for multiple rib fracture otherwise unremarkable  EKG mild sinus tachycardia      Past Medical History:        Diagnosis Date    CAD (coronary artery disease)     Diabetes mellitus ([address]     Empyema ([address]     GERD (gastroesophageal reflux disease)     Hyperlipidemia     Hypertension     Low back pain     MI (myocardial infarction) ([address]     PVD (peripheral vascular disease) ([address]     Sleep apnea     TIA (transient ischemic attack)        Past Surgical History:        Procedure Laterality Date    BACK INJECTION      CORONARY ARTERY BYPASS GRAFT      OTHER SURGICAL HISTORY      right leg vascular stents    SIGMOIDOSCOPY N/A 4/19/2021    SIGMOIDOSCOPY DIAGNOSTIC FLEXIBLE performed by daily    Historical Provider, MD   DULoxetine (CYMBALTA) 30 MG extended release capsule Take 30 mg by mouth daily    Historical Provider, MD   famotidine (PEPCID) 40 MG tablet Take 40 mg by mouth daily    Historical Provider, MD   finasteride (PROSCAR) 5 MG tablet Take 5 mg by mouth daily    Historical Provider, MD       Allergies:  Amiodarone and Bactrim [sulfamethoxazole-trimethoprim]    Social History:  The patient currently lives with family    TOBACCO:   reports that he has been smoking. He has a 80.00 pack-year smoking history. He has never used smokeless tobacco.  ETOH:   reports previous alcohol use. Family History:  Reviewed in detail and negative for DM, Early CAD, Cancer, CVA. Positive as follows:    History reviewed. No pertinent family history. REVIEW OF SYSTEMS:   Positive for recurrent falls from wheelchair, right great toe wound with cellulitis, not able to care at home and as noted in the HPI. All other systems reviewed and negative. PHYSICAL EXAM:    BP (!) 137/94   Pulse (!) 105   Temp 98.6 °F (37 °C) (Oral)   Resp 17   Ht 5' 8\" (1.727 m)   Wt 176 lb (79.8 kg)   SpO2 100%   BMI 26.76 kg/m²     General appearance: No apparent distress appears stated age and cooperative. HEENT Normal cephalic, atraumatic without obvious deformity. Pupils equal, round, and reactive to light. Extra ocular muscles intact. Conjunctivae/corneas clear. Neck: Supple, No jugular venous distention/bruits. Lungs: Clear to auscultation, bilaterally without Rales/Wheezes/Rhonchi with good respiratory effort. Heart: Regular rate and rhythm with Normal S1/S2 without murmurs, rubs or gallops  Abdomen: Soft, non-tender or non-distended without rigidity or guarding and positive bowel sounds   Extremities: No clubbing, cyanosis, or edema bilaterally.    Skin: Skin color, texture, turgor normal.  Right hallux wound with erythema, right shin wound dressed  Neurologic: Alert and oriented X 3, neurovascularly intact with sensory/motor intact upper extremities/lower extremities, bilaterally. Cranial nerves: II-XII intact, grossly non-focal.  Mental status: Alert, oriented, thought content appropriate. CBC   Recent Labs     06/18/22 2241   WBC 12.2*   HGB 13.4*   HCT 41.3   *      RENAL  Recent Labs     06/18/22 2241      K 4.8      CO2 25   BUN 18   CREATININE 0.8     LFT'S  Recent Labs     06/18/22 2241   AST 13*   ALT 10   BILIDIR <0.2   BILITOT 0.3   ALKPHOS 264*     COAG  Recent Labs     06/18/22 2241   INR 0.95     CARDIAC ENZYMES  Recent Labs     06/18/22 2241   TROPONINI 0.03*       Active Hospital Problems    Diagnosis Date Noted    Right hallux infection [L08.9] 06/19/2022     Priority: Medium    Open wound of right great toe [S91.101A] 06/19/2022     Priority: Medium    Frequent falls [R29.6] 06/19/2022     Priority: Medium    Closed fracture of multiple ribs of right side with routine healing [S22.41XD] 06/19/2022     Priority: Medium    Status post above knee amputation, left [facility]) [K80.045] 06/19/2022     Priority: Medium         ASSESSMENT/PLAN:   63 y.o. male with history of CAD, type 2 diabetes, emphysema, GERD, hyperlipidemia, hypertension, peripheral vascular disease who had a recent left AKA. He comes from home following recurrent falls and chest injury with right chest discomfort found to have right great toe cellulitis and wound, multiple rib fracture on the right and clean AKA stump    Plan:  - Continue IV antibiotics for hallux cellulitis and wound  - Wound care consult  - PT OT evaluation and social work in view of discharge planning  - On insulin  - Pain management  - Continue other chronic home medications      DVT Prophylaxis: Subcut enoxaparin  Diet: Diabetic diet  Code Status: Full code         Marichuy Nicolas MD    Thank you No primary care provider on file. for the opportunity to be involved in this patient's care.  If you have any questions or concerns please feel free to contact me at [phone].

## 2022-06-19 NOTE — PROGRESS NOTES
[facility]    6/19/2022 11:42 AM        Name: Wolfgang Guerin . Admitted: 6/18/2022  Primary Care Provider: No primary care provider on file. (Tel: None)      Subjective:    Patient feeling ok. Having R chest wall pain. Wife was pushing him down their wheelchair ramp of the care when he went to fast and fell forward at bottom of the ramp falling on the ground on his R side. He was recently at [facility] with hyperglycemia but also has abnormal stress test. Cath was not performed-cards elected for maximizing medical therapy.        Reviewed interval ancillary notes    Current Medications  aspirin EC tablet 81 mg, Daily  atorvastatin (LIPITOR) tablet 80 mg, Daily  clopidogrel (PLAVIX) tablet 75 mg, Daily  DULoxetine (CYMBALTA) extended release capsule 30 mg, BID  metoprolol succinate (TOPROL XL) extended release tablet 50 mg, Daily  pregabalin (LYRICA) capsule 150 mg, BID  spironolactone (ALDACTONE) tablet 25 mg, Daily  sodium chloride flush 0.9 % injection 5-40 mL, 2 times per day  sodium chloride flush 0.9 % injection 5-40 mL, PRN  0.9 % sodium chloride infusion, PRN  enoxaparin (LOVENOX) injection 40 mg, Daily  ondansetron (ZOFRAN-ODT) disintegrating tablet 4 mg, Q8H PRN   Or  ondansetron (ZOFRAN) injection 4 mg, Q6H PRN  polyethylene glycol (GLYCOLAX) packet 17 g, Daily PRN  acetaminophen (TYLENOL) tablet 650 mg, Q6H PRN   Or  acetaminophen (TYLENOL) suppository 650 mg, Q6H PRN  ceFAZolin (ANCEF) 1,000 mg in dextrose 5 % 50 mL IVPB (mini-bag), Q8H  dextrose bolus 10% 125 mL, PRN   Or  dextrose bolus 10% 250 mL, PRN  glucagon (rDNA) injection 1 mg, PRN  dextrose 5 % solution, PRN  losartan (COZAAR) tablet 50 mg, BID  insulin glargine (LANTUS) injection vial 20 Units, Nightly  insulin lispro (HUMALOG) injection vial 0-9 Units, TID WC  insulin lispro (HUMALOG) injection vial 0-12 Units, TID WC  insulin lispro (HUMALOG) injection vial 0-6 Units, Nightly  insulin lispro (HUMALOG) injection vial 5 Units, TID WC  HYDROcodone-acetaminophen (NORCO) 5-325 MG per tablet 1 tablet, Q6H PRN        Objective:  BP (!) 143/78   Pulse (!) 115   Temp 97.8 °F (36.6 °C)   Resp 16   Ht 5' 8\" (1.727 m)   Wt 165 lb 12.8 oz (75.2 kg)   SpO2 93%   BMI 25.21 kg/m²     Intake/Output Summary (Last 24 hours) at 6/19/2022 1142  Last data filed at 6/19/2022 1015  Gross per 24 hour   Intake 10 ml   Output --   Net 10 ml      Wt Readings from Last 3 Encounters:   06/19/22 165 lb 12.8 oz (75.2 kg)       General appearance:  Appears comfortable. Disheveled. Eyes: Sclera clear. Pupils equal.  ENT: Moist oral mucosa. Trachea midline, no adenopathy. Cardiovascular: Regular rhythm, normal S1, S2. No murmur. No edema in lower extremities  Respiratory: Not using accessory muscles. Good inspiratory effort. Clear to auscultation bilaterally, no wheeze or crackles. GI: Abdomen soft, no tenderness, not distended, normal bowel sounds  Musculoskeletal: L aka  Neurology: CN 2-12 grossly intact. No speech or motor deficits  Psych: Normal affect. Alert and oriented in time, place and person  Skin: cellulitis R great toe, several scabbed areas. Superficial ulcer lateral R leg. No surrounding cellulitis    Labs and Tests:  CBC:   Recent Labs     06/18/22 2241   WBC 12.2*   HGB 13.4*   *     BMP:    Recent Labs     06/18/22 2241      K 4.8      CO2 25   BUN 18   CREATININE 0.8   GLUCOSE 456*     Hepatic:   Recent Labs     06/18/22 2241   AST 13*   ALT 10   BILITOT 0.3   ALKPHOS 264*     Cardiology note from Dr Елена Power at [facility].    Stress examination with small apical reperfusion defect. Risk to benefit not in favor of invasive cardiac catheterization. Will concentrate on hypertensive urgency as presenting picture and maximizing medical therapy to assist with pressure control and improvement LVEF.  Losartan increased to twice daily today, will observe clinical response. Problem List  Principal Problem:    Right hallux infection  Active Problems:    Open wound of right great toe    Frequent falls    Closed fracture of multiple ribs of right side with routine healing    Status post above knee amputation, left (HCC)  Resolved Problems:    * No resolved hospital problems. *       Assessment & Plan:   1. R great toe cellulitis-change to cefepime. Check Nares for mrsa. Xray R foot  2. Multiple rib fractures-add norco. Encourage IS. 3. Dm 2-uncontrolled. Continue lantus 20, add 5 of humalog plus SSI. 4. CAD-had recent abnormal stress test however cath deferred for maximizing medical therapy. 5. Disposition-consult PT/OT. Will need placement. Refused at [facility] from previous facility due to bad experience in [facility]. Currently living in Extended stay hotel. Diet: ADULT DIET;  Regular; 5 carb choices (75 gm/meal)  Code:Full Code  DVT PPX: lovenox      Memory SEN Dixon   6/19/2022 11:42 AM

## 2022-06-19 NOTE — ED NOTES
Patient resting in bed, with eyes closed at this time. Patient being monitored on continuous pulse ox, tele, as well as bp cuff. Bed locked and in lowest position, call light within reach.      Sera Brink RN  06/19/22 6418

## 2022-06-19 NOTE — CONSULTS
Podiatry Progress Note    Subjective: The patient is a 63 y.o. male with history of CAD, type 2 diabetes, emphysema, GERD, hyperlipidemia, hypertension, peripheral vascular disease who had a recent left AKA. He comes from home following recurrent falls and complaining of right chest discomfort. He fell multiple times today. Patient was recently discharged from the hospital and recommended for rehab however he declined. Today he wishes to go to rehab at discharge. No significant cough or production, no fevers or chills. He also has right great toe ulcer from recent injury. There is also right shin wound which is dressed. Patient not able to take care of self at home. Podiatry consulted for evaluation of R hallux cellulitis. ROS: Denies nausea, vomiting, fevers, chills.     Past Medical History:   Diagnosis Date    CAD (coronary artery disease)     [facility]     Empyema ([facility]     GERD (gastroesophageal reflux disease)     Hyperlipidemia     Hypertension     Low back pain     [facility] infarction[facility]     [facility]peripheral [facility]) [facility]     Sleep apnea     TIA (transient ischemic attack)         Allergies   Allergen Reactions    Amiodarone     Bactrim [Sulfamethoxazole-Trimethoprim] Hives        Current Facility-Administered Medications   Medication Dose Route Frequency Provider Last Rate Last Admin    aspirin EC tablet 81 mg  81 mg Oral Daily Yrn Henao MD   81 mg at 06/19/22 1015    atorvastatin (LIPITOR) tablet 80 mg  80 mg Oral Daily Yrn Henao MD   80 mg at 06/19/22 1015    clopidogrel (PLAVIX) tablet 75 mg  75 mg Oral Daily Yrn Henao MD   75 mg at 06/19/22 1014    DULoxetine (CYMBALTA) extended release capsule 30 mg  30 mg Oral BID Yrn Henao MD   30 mg at 06/19/22 1014    metoprolol succinate (TOPROL XL) extended release tablet 50 mg  50 mg Oral Daily Yrn Henao MD   50 mg at 06/19/22 1015    pregabalin (LYRICA) capsule 150 mg  150 mg Oral BID Yrn Henao MD   150 mg at 06/19/22 1014    spironolactone (ALDACTONE) tablet 25 mg  25 mg Oral Daily Yrn Henao MD   25 mg at 06/19/22 1015    sodium chloride flush 0.9 % injection 5-40 mL  5-40 mL IntraVENous 2 times per day Yrn Henao MD   10 mL at 06/19/22 1015    sodium chloride flush 0.9 % injection 5-40 mL  5-40 mL IntraVENous PRN Yrn Henao MD        0.9 % sodium chloride infusion   IntraVENous PRN Yrn Henao MD        enoxaparin (LOVENOX) injection 40 mg  40 mg SubCUTAneous Daily Yrn Henao MD   40 mg at 06/19/22 1015    ondansetron (ZOFRAN-ODT) disintegrating tablet 4 mg  4 mg Oral Q8H PRN Yrn Henao MD        Or    ondansetron (ZOFRAN) injection 4 mg  4 mg IntraVENous Q6H PRN Yrn Henao MD        polyethylene glycol (GLYCOLAX) packet 17 g  17 g Oral Daily PRN Yrn Henao MD        acetaminophen (TYLENOL) tablet 650 mg  650 mg Oral Q6H PRN Yrn Henao MD        Or    acetaminophen (TYLENOL) suppository 650 mg  650 mg Rectal Q6H PRN Yrn Henao MD        ceFAZolin (ANCEF) 1,000 mg in dextrose 5 % 50 mL IVPB (mini-bag)  1,000 mg IntraVENous Q8H Yrn Henao MD   Stopped at 06/19/22 1351    dextrose bolus 10% 125 mL  125 mL IntraVENous PRN Yrn Henao MD        Or    dextrose bolus 10% 250 mL  250 mL IntraVENous PRN Yrn Henao MD        glucagon (rDNA) injection 1 mg  1 mg IntraMUSCular PRN Yrn Henao MD        dextrose 5 % solution  100 mL/hr IntraVENous PRN Yrn Henao MD        losartan (COZAAR) tablet 50 mg  50 mg Oral BID Yrn Henao MD   50 mg at 06/19/22 1015    insulin glargine (LANTUS) injection vial 20 Units  20 Units SubCUTAneous Nightly Yrn Henao MD        insulin lispro (HUMALOG) injection vial 0-12 Units  0-12 Units SubCUTAneous TID  Dami Long PA-C   4 Units at 06/19/22 1324    insulin lispro (HUMALOG) injection vial 0-6 Units  0-6 Units SubCUTAneous Nightly Shaan Maldonado PA-C        insulin lispro (HUMALOG) injection vial 5 Units  5 Units SubCUTAneous TID WC Shaan Maldonado PA-C   5 Units at 06/19/22 1324    HYDROcodone-acetaminophen (NORCO) 5-325 MG per tablet 1 tablet  1 tablet Oral Q6H PRN Shaan Maldonado PA-C            Objective:    /72   Pulse 99   Temp 97.4 °F (36.3 °C)   Resp 14   Ht 5' 8\" (1.727 m)   Wt 165 lb 12.8 oz (75.2 kg)   SpO2 95%   BMI 25.21 kg/m²   In: 10 [I.V.:10]  Out: -      Exam:  Vascular: Pulses diminished R LE. Edema and erythema noted to the medial R hallux. Derm: Multiple areas that are covered in scab noted to the R hallux. No probe to bone. Superficial abrasion noted R shin. No SOI  Musculoskeletal: Amputation of 4th and 5th ray R foot. Contracted digits 1-3 noted. Muscle strength decreased. Neuro: Unable to be assessed due to pt sleeping through exam.       Data:    CBC with Differential:    Lab Results   Component Value Date    WBC 12.2 06/18/2022    RBC 4.93 06/18/2022    HGB 13.4 06/18/2022    HCT 41.3 06/18/2022     06/18/2022    MCV 83.9 06/18/2022    MCH 27.2 06/18/2022    MCHC 32.4 06/18/2022    RDW 16.8 06/18/2022    LYMPHOPCT 13.9 06/18/2022    MONOPCT 7.0 06/18/2022    BASOPCT 0.7 06/18/2022    MONOSABS 0.8 06/18/2022    LYMPHSABS 1.7 06/18/2022    EOSABS 1.0 06/18/2022    BASOSABS 0.1 06/18/2022     CMP:    Lab Results   Component Value Date     06/18/2022    K 4.8 06/18/2022     06/18/2022    CO2 25 06/18/2022    BUN 18 06/18/2022    CREATININE 0.8 06/18/2022    GFRAA >60 06/18/2022    LABGLOM >60 06/18/2022    GLUCOSE 456 06/18/2022    PROT 7.8 06/18/2022    LABALBU 3.9 06/18/2022    CALCIUM 10.2 06/18/2022    BILITOT 0.3 06/18/2022    ALKPHOS 264 06/18/2022    AST 13 06/18/2022    ALT 10 06/18/2022        Assessment:  1. Cellulitis of R hallux  2. DM2  3.   Superficial ulcerations to R hallux. Plan:   1. Pt examined  2. Recommend abx ointment and dry dressing to R hallux  3. Follow up on xrays. 4. WBAT R LE  5. Abx per primary    If xrays do not show any OM pt is ok for discharge from podiatry standpoint. Would recommend follow up in wound care. Continue dressing with abx ointment and dry dressing.           [email or web address] Dennis, DPM  6/19/2022

## 2022-06-19 NOTE — PLAN OF CARE
Problem: Pain  Goal: Verbalizes/displays adequate comfort level or baseline comfort level  Outcome: Progressing     Problem: Safety - Adult  Goal: Free from fall injury  Outcome: Progressing     Problem: Skin/Tissue Integrity  Goal: Absence of new skin breakdown  Description: 1. Monitor for areas of redness and/or skin breakdown  2. Assess vascular access sites hourly  3. Every 4-6 hours minimum:  Change oxygen saturation probe site  4. Every 4-6 hours:  If on nasal continuous positive airway pressure, respiratory therapy assess nares and determine need for appliance change or resting period. Outcome: Progressing     Problem: Chronic Conditions and Co-morbidities  Goal: Patient's chronic conditions and co-morbidity symptoms are monitored and maintained or improved  Outcome: Progressing     Admitted from ER to room 5573. Patient alert and oriented - [place]. Patient able to make position changes independently - assisting as needed. Patient remained free from falls - Bedrest - AKA. Room air. PRN medication for pain. IV ABX. External catheter. Patient from home - discharge plans pending.

## 2022-06-20 LAB
ANION GAP SERPL CALCULATED.3IONS-SCNC: 10 MMOL/L (ref 3–16)
BACTERIA: ABNORMAL /HPF
BASOPHILS ABSOLUTE: 0.1 K/UL (ref 0–0.2)
BASOPHILS RELATIVE PERCENT: 0.6 %
BILIRUBIN URINE: NEGATIVE
BLOOD, URINE: ABNORMAL
BUN BLDV-MCNC: 26 MG/DL (ref 7–20)
CALCIUM SERPL-MCNC: 8.9 MG/DL (ref 8.3–10.6)
CHLORIDE BLD-SCNC: 101 MMOL/L (ref 99–110)
CLARITY: ABNORMAL
CO2: 22 MMOL/L (ref 21–32)
COLOR: YELLOW
CREAT SERPL-MCNC: 0.8 MG/DL (ref 0.8–1.3)
EOSINOPHILS ABSOLUTE: 0.6 K/UL (ref 0–0.6)
EOSINOPHILS RELATIVE PERCENT: 6.1 %
EPITHELIAL CELLS, UA: 0 /HPF (ref 0–5)
GFR AFRICAN AMERICAN: >60
GFR NON-AFRICAN AMERICAN: >60
GLUCOSE BLD-MCNC: 119 MG/DL (ref 70–99)
GLUCOSE BLD-MCNC: 126 MG/DL (ref 70–99)
GLUCOSE BLD-MCNC: 131 MG/DL (ref 70–99)
GLUCOSE BLD-MCNC: 176 MG/DL (ref 70–99)
GLUCOSE BLD-MCNC: 212 MG/DL (ref 70–99)
GLUCOSE BLD-MCNC: 242 MG/DL (ref 70–99)
GLUCOSE URINE: >=1000 MG/DL
HCT VFR BLD CALC: 36.5 % (ref 40.5–52.5)
HEMOGLOBIN: 11.8 G/DL (ref 13.5–17.5)
HYALINE CASTS: 2 /LPF (ref 0–8)
KETONES, URINE: ABNORMAL MG/DL
LEUKOCYTE ESTERASE, URINE: ABNORMAL
LYMPHOCYTES ABSOLUTE: 2.4 K/UL (ref 1–5.1)
LYMPHOCYTES RELATIVE PERCENT: 23.2 %
MCH RBC QN AUTO: 27 PG (ref 26–34)
MCHC RBC AUTO-ENTMCNC: 32.4 G/DL (ref 31–36)
MCV RBC AUTO: 83.4 FL (ref 80–100)
MICROSCOPIC EXAMINATION: YES
MONOCYTES ABSOLUTE: 1 K/UL (ref 0–1.3)
MONOCYTES RELATIVE PERCENT: 9.7 %
NEUTROPHILS ABSOLUTE: 6.2 K/UL (ref 1.7–7.7)
NEUTROPHILS RELATIVE PERCENT: 60.4 %
NITRITE, URINE: NEGATIVE
PDW BLD-RTO: 16.8 % (ref 12.4–15.4)
PERFORMED ON: ABNORMAL
PH UA: 5.5 (ref 5–8)
PLATELET # BLD: 383 K/UL (ref 135–450)
PMV BLD AUTO: 8.1 FL (ref 5–10.5)
POTASSIUM REFLEX MAGNESIUM: 3.7 MMOL/L (ref 3.5–5.1)
PROTEIN UA: 300 MG/DL
RBC # BLD: 4.38 M/UL (ref 4.2–5.9)
RBC UA: 1 /HPF (ref 0–4)
SODIUM BLD-SCNC: 133 MMOL/L (ref 136–145)
SPECIFIC GRAVITY UA: 1.02 (ref 1–1.03)
URINE TYPE: ABNORMAL
UROBILINOGEN, URINE: 0.2 E.U./DL
WBC # BLD: 10.3 K/UL (ref 4–11)
WBC UA: 325 /HPF (ref 0–5)

## 2022-06-20 PROCEDURE — 2580000003 HC RX 258: Performed by: PHYSICIAN ASSISTANT

## 2022-06-20 PROCEDURE — 97530 THERAPEUTIC ACTIVITIES: CPT

## 2022-06-20 PROCEDURE — 87077 CULTURE AEROBIC IDENTIFY: CPT

## 2022-06-20 PROCEDURE — 6370000000 HC RX 637 (ALT 250 FOR IP): Performed by: HOSPITALIST

## 2022-06-20 PROCEDURE — 6370000000 HC RX 637 (ALT 250 FOR IP): Performed by: PHYSICIAN ASSISTANT

## 2022-06-20 PROCEDURE — 6360000002 HC RX W HCPCS: Performed by: PHYSICIAN ASSISTANT

## 2022-06-20 PROCEDURE — 87449 NOS EACH ORGANISM AG IA: CPT

## 2022-06-20 PROCEDURE — 2580000003 HC RX 258: Performed by: INTERNAL MEDICINE

## 2022-06-20 PROCEDURE — 1200000000 HC SEMI PRIVATE

## 2022-06-20 PROCEDURE — 6370000000 HC RX 637 (ALT 250 FOR IP): Performed by: INTERNAL MEDICINE

## 2022-06-20 PROCEDURE — 97163 PT EVAL HIGH COMPLEX 45 MIN: CPT

## 2022-06-20 PROCEDURE — 86403 PARTICLE AGGLUT ANTBDY SCRN: CPT

## 2022-06-20 PROCEDURE — 80048 BASIC METABOLIC PNL TOTAL CA: CPT

## 2022-06-20 PROCEDURE — 36415 COLL VENOUS BLD VENIPUNCTURE: CPT

## 2022-06-20 PROCEDURE — 87086 URINE CULTURE/COLONY COUNT: CPT

## 2022-06-20 PROCEDURE — 85025 COMPLETE CBC W/AUTO DIFF WBC: CPT

## 2022-06-20 PROCEDURE — 6360000002 HC RX W HCPCS: Performed by: INTERNAL MEDICINE

## 2022-06-20 PROCEDURE — 97535 SELF CARE MNGMENT TRAINING: CPT

## 2022-06-20 PROCEDURE — 81001 URINALYSIS AUTO W/SCOPE: CPT

## 2022-06-20 PROCEDURE — 87186 SC STD MICRODIL/AGAR DIL: CPT

## 2022-06-20 PROCEDURE — 97166 OT EVAL MOD COMPLEX 45 MIN: CPT

## 2022-06-20 RX ORDER — DIPHENHYDRAMINE HCL 25 MG
25 TABLET ORAL NIGHTLY PRN
Status: DISCONTINUED | OUTPATIENT
Start: 2022-06-20 | End: 2022-06-24 | Stop reason: HOSPADM

## 2022-06-20 RX ADMIN — DULOXETINE HYDROCHLORIDE 30 MG: 30 CAPSULE, DELAYED RELEASE ORAL at 08:54

## 2022-06-20 RX ADMIN — INSULIN GLARGINE 20 UNITS: 100 INJECTION, SOLUTION SUBCUTANEOUS at 22:18

## 2022-06-20 RX ADMIN — INSULIN LISPRO 5 UNITS: 100 INJECTION, SOLUTION INTRAVENOUS; SUBCUTANEOUS at 13:30

## 2022-06-20 RX ADMIN — ENOXAPARIN SODIUM 40 MG: 100 INJECTION SUBCUTANEOUS at 08:54

## 2022-06-20 RX ADMIN — LOSARTAN POTASSIUM 50 MG: 25 TABLET, FILM COATED ORAL at 21:58

## 2022-06-20 RX ADMIN — INSULIN LISPRO 5 UNITS: 100 INJECTION, SOLUTION INTRAVENOUS; SUBCUTANEOUS at 19:01

## 2022-06-20 RX ADMIN — INSULIN LISPRO 4 UNITS: 100 INJECTION, SOLUTION INTRAVENOUS; SUBCUTANEOUS at 09:00

## 2022-06-20 RX ADMIN — HYDROCODONE BITARTRATE AND ACETAMINOPHEN 1 TABLET: 5; 325 TABLET ORAL at 05:04

## 2022-06-20 RX ADMIN — HYDROCODONE BITARTRATE AND ACETAMINOPHEN 1 TABLET: 5; 325 TABLET ORAL at 17:30

## 2022-06-20 RX ADMIN — MUPIROCIN: 20 OINTMENT TOPICAL at 08:56

## 2022-06-20 RX ADMIN — Medication 10 ML: at 22:22

## 2022-06-20 RX ADMIN — PIPERACILLIN AND TAZOBACTAM 3375 MG: 3; .375 INJECTION, POWDER, LYOPHILIZED, FOR SOLUTION INTRAVENOUS at 22:09

## 2022-06-20 RX ADMIN — DIPHENHYDRAMINE HYDROCHLORIDE 25 MG: 25 TABLET ORAL at 01:24

## 2022-06-20 RX ADMIN — CEFAZOLIN SODIUM 1000 MG: 1 INJECTION, POWDER, FOR SOLUTION INTRAMUSCULAR; INTRAVENOUS at 12:41

## 2022-06-20 RX ADMIN — CEFAZOLIN SODIUM 1000 MG: 1 INJECTION, POWDER, FOR SOLUTION INTRAMUSCULAR; INTRAVENOUS at 05:19

## 2022-06-20 RX ADMIN — PREGABALIN 150 MG: 75 CAPSULE ORAL at 21:58

## 2022-06-20 RX ADMIN — INSULIN LISPRO 5 UNITS: 100 INJECTION, SOLUTION INTRAVENOUS; SUBCUTANEOUS at 09:00

## 2022-06-20 RX ADMIN — PREGABALIN 150 MG: 75 CAPSULE ORAL at 08:55

## 2022-06-20 RX ADMIN — METOPROLOL SUCCINATE 50 MG: 50 TABLET, EXTENDED RELEASE ORAL at 08:55

## 2022-06-20 RX ADMIN — HYDROCODONE BITARTRATE AND ACETAMINOPHEN 1 TABLET: 5; 325 TABLET ORAL at 11:30

## 2022-06-20 RX ADMIN — DULOXETINE HYDROCHLORIDE 30 MG: 30 CAPSULE, DELAYED RELEASE ORAL at 21:58

## 2022-06-20 RX ADMIN — CLOPIDOGREL BISULFATE 75 MG: 75 TABLET ORAL at 08:55

## 2022-06-20 RX ADMIN — ATORVASTATIN CALCIUM 80 MG: 80 TABLET, FILM COATED ORAL at 08:55

## 2022-06-20 RX ADMIN — ASPIRIN 81 MG: 81 TABLET, COATED ORAL at 08:55

## 2022-06-20 ASSESSMENT — PAIN SCALES - GENERAL
PAINLEVEL_OUTOF10: 5
PAINLEVEL_OUTOF10: 0
PAINLEVEL_OUTOF10: 0
PAINLEVEL_OUTOF10: 10
PAINLEVEL_OUTOF10: 0

## 2022-06-20 ASSESSMENT — PAIN DESCRIPTION - DESCRIPTORS: DESCRIPTORS: BURNING

## 2022-06-20 ASSESSMENT — PAIN DESCRIPTION - LOCATION: LOCATION: GENERALIZED

## 2022-06-20 NOTE — PROGRESS NOTES
[facility]   Phone: [phone]    Occupational Therapy    [x] Initial Evaluation            [] Daily Treatment Note         [] Discharge Summary      Patient: Annabelle Carbone   : 1960   MRN: [id number]   Date of Service:  2022    Admitting Diagnosis:  Toe infection  Current Admission Summary: The patient is a 59 y. o. male with history of CAD, type 2 diabetes, emphysema, GERD, hyperlipidemia, hypertension, peripheral vascular disease who had a recent left AKA.  He comes from home following recurrent falls and complaining of right chest discomfort.  He fell multiple times.  Patient was recently discharged from the hospital and recommended for rehab however he declined. Maria Joe wishes to go to rehab at discharge.  No significant cough or production, no fevers or chills. Past Medical History:  has a past medical history of CAD (coronary artery disease), Diabetes mellitus ([address] Empyema ([address] GERD (gastroesophageal reflux disease), Hyperlipidemia, Hypertension, Low back pain, MI (myocardial infarction) ([address] PVD (peripheral vascular disease) ([address] Sleep apnea, and TIA (transient ischemic attack). Past Surgical History:  has a past surgical history that includes Coronary artery bypass graft; Toe amputation (Right); Back Injection; other surgical history; sigmoidoscopy (N/A, 2021); and Upper gastrointestinal endoscopy (N/A, 2021). Discharge Recommendations: Annabelle Carbone scored a  on the AM-PAC ADL Inpatient form. Current research shows that an AM-PAC score of 17 or less is typically not associated with a discharge to the patient's home setting. Based on the patient's AM-PAC score and their current ADL deficits, it is recommended that the patient have 3-5 sessions per week of Occupational Therapy at d/c to increase the patient's independence. Please see assessment section for further patient specific details.     If patient discharges prior to next session this note will serve as a discharge summary. Please see below for the latest assessment towards goals. DME Required For Discharge: DME to be determined at next level of care    Precautions/Restrictions: high fall risk  Weight Bearing Restrictions: weight bearing as tolerated  [] Right Upper Extremity  [] Left Upper Extremity [] Right Lower Extremity  [] Left Lower Extremity     Required Braces/Orthotics: no braces required   [] Right  [] Left  Positional Restrictions:no positional restrictions    Pre-Admission Information   Lives With: spouse              Type of Home: hotel  Home Layout: one level, able to live on main level  Home Access: level entry  Bathroom Layout: tub/shower unit, . Comment: Pt reports bathroom is not accessible, uses bedside commode, pt sits on bed for sponge baths  Toilet Height: standard height  Bathroom Equipment: . Comment: Pt is unsure  Home Equipment: rolling walker, manual wheelchair  Transfer Assistance: modified independent with use of slideboard, caregivers  Ambulation Assistance:requires assistance, . Comment: with WC  ADL Assistance: requires assistance with bathing, requires assistance with dressing, requires assistance with feeding, requires assistance with toileting  IADL Assistance: requires assistance with all homemaking tasks  Active :        []? Yes            [x]? No  Hand Dominance: []? Left            []? Right  Current Employment: retired. Occupation: computers for the [employer]:   Recent Falls: 4   Comment: Pt questionable historian at times.  Often stated, \"You'll have to ask my wife\"    Examination   Vision:   Vision Gross Assessment: WFL  Hearing:   hard of hearing  Sensation:   reports numbness and tingling in (R) UE, (L) UE and reduced light touch to (R) UE, (L) UE (hands & forearms)  Tone:   Normotonic  Coordination Testing:   Coordination and Movement Description: (R) UE, (L) UE, fine motor impairments, decreased speed, decreased accuracy    ROM:   (L) Shoulder: ~80 degrees     (R) Shoulder: WFL  (B) Elbow AROM WFL  (B) Wrist AROM WFL  (L) Hand: WFL     (R) Hand: WFL  Strength:   (B) UE strength grossly WFL    Decision Making: medium complexity  Clinical Presentation: evolving      Subjective  General: Pt semi-reclining in bed on OT arrival. Pt sleeping soundly and difficult to arouse. RN assisted, stating that pt didn't sleep at all last night. Pt agreeable to therapy evaluation with encouragement. Pt reports 10/10 pain in R ribs and low back. Pain: 10/10. Location: R ribs, low back  Pain Interventions: RN notified of patient request for pain medication        Activities of Daily Living  Basic Activities of Daily Living  Upper Extremity Bathing: moderate assistance . Comment: Required encouragement to participate; pt asked OT to wash him, as his wife helps at home. Equipment: none  Lower Extremity Bathing: dependent . Comment: tony hygiene. Equipment: none  Upper Extremity Dressing: dependent . Comment: gown. Equipment: none  Lower Extremity Dressing: dependent. Equipment: none  Dressing Comments: don/doff brief, don R sock  Toileting: dependent. Equipment: none  Toileting Comments: in supine. Pt saturated in urine    Functional Mobility  Bed Mobility  Supine to Sit: dependent assistance  Rolling Left: contact guard assistance  Rolling Right: maximum assistance  Comments: Pt reports pain in R ribs, does not follow commands for hand placement and performing bed mobility. Reluctant to roll to R side d/t rib pain. Comments: After supine to sit, noted that pt's Purewick wasn't well attached and was leaking. Pt assisted with lying down to remove Purewik and perform tony hygiene and brief change. Transfers  Slide board transfer: contact guard assistance  Comments:Pt refusing assistance. Did need assist to place board. Transferred to drop-arm recliner on R side. Extra time.   Functional Mobility:  Sitting Balance: stand by assistance. Sitting Balance Comment: on EOB      Functional Outcomes  -PAC Inpatient Daily Activity Raw Score: 12    Cognition  Overall Cognitive Status: Impaired  Arousal/Alterness: inconsistent responses to stimuli  Following Commands: follows one step commands with repetition, follows one step commands with increased time, inconsistently follows commands  Attention Span: difficulty attending to directions  Memory: decreased recall of biographical information  Safety Judgement: decreased awareness of need for assistance  Problem Solving: assistance required to generate solutions, assistance required to implement solutions, decreased awareness of errors, assistance required to identify errors made, assistance required to correct errors made  Insights: not aware of deficits  Initiation: requires cues for some  Sequencing: requires cues for all   Comment: Pt labile in emotional reactions to therapy eval, from mildly agitated to joking to making a number of inappropriate comments, such as, \"What if I licked your arm? \" and stating that the therapist was hitting him and therapists were trying to hurt him, when purpose of evaluation and activities explained. Pt tangential in conversation, seeming to test reactions of therapists to his comments.   Orientation:    alert and oriented x 4  Command Following:   impaired     Education  Barriers To Learning: cognition, hearing and emotional  Patient Education: patient educated on OT role and benefits, plan of care, transfer training, discharge recommendations  Learning Assessment:  patient will require reinforcement due to cognitive deficits, patient resistive towards education topics within session, would benefit from continued education    Assessment  Activity Tolerance: Limited by pain, agitation, cognition  Impairments Requiring Therapeutic Intervention: decreased functional mobility, decreased ADL status, decreased cognition, decreased endurance, increased pain  Prognosis: fair  Clinical Assessment: Pt is below his baseline level of occupational function, based on the above deficits associated with toe infection. Pt is very limited by pain and cognitive deficits. He needs increased support for mobility and ADLs. Pt would benefit from continued skilled acute OT services to address these deficits and from further skilled OT services after discharge to maximize his safety and independence. Safety Interventions: patient left in chair, chair alarm in place, call light within reach, gait belt, patient at risk for falls and nurse notified    Plan  Frequency: 3-5 x/per week  Current Treatment Recommendations: functional mobility training, transfer training, endurance training, ADL/self-care training and safety education    Goals  Patient Goals: Not stated   Short Term Goals:  Time Frame: Discharge  Patient will complete upper body ADL at stand by assistance   Patient will complete lower body ADL at moderate assistance   Patient will complete toileting at maximum assistance   Patient will complete grooming at supervision   Patient will complete functional transfers at stand by assistance, .   Comment using sliding board     Therapy Session Time     Individual Group Co-treatment   Time In    1107   Time Out    1232   Minutes    85        Timed Code Treatment Minutes:   60 min  Total Treatment Minutes:  85 min       Electronically Signed By: KEVIN Shah, OTR/L, [id number]

## 2022-06-20 NOTE — PROGRESS NOTES
[facility] PROGRESS NOTE    6/20/2022 4:52 PM        Name: Jacki Torres . Admitted: 6/18/2022  Primary Care Provider: No primary care provider on file. (Tel: None)      Subjective:    Patient was admitted with weakness, R great toe cellulitis. Wife was pushing him down their wheelchair ramp of the care when he went to fast and fell forward at bottom of the ramp falling on the ground on his R side. He was recently at [facility] with hyperglycemia but also has abnormal stress test. Cath was not performed-cards elected for maximizing medical therapy. Patient seems slightly confused today. Yesterday was very awake, joking around. Doesn't really remember me seeing him. Speech less clear.        Reviewed interval ancillary notes    Current Medications  diphenhydrAMINE (BENADRYL) tablet 25 mg, Nightly PRN  aspirin EC tablet 81 mg, Daily  atorvastatin (LIPITOR) tablet 80 mg, Daily  clopidogrel (PLAVIX) tablet 75 mg, Daily  DULoxetine (CYMBALTA) extended release capsule 30 mg, BID  metoprolol succinate (TOPROL XL) extended release tablet 50 mg, Daily  pregabalin (LYRICA) capsule 150 mg, BID  spironolactone (ALDACTONE) tablet 25 mg, Daily  sodium chloride flush 0.9 % injection 5-40 mL, 2 times per day  sodium chloride flush 0.9 % injection 5-40 mL, PRN  0.9 % sodium chloride infusion, PRN  enoxaparin (LOVENOX) injection 40 mg, Daily  ondansetron (ZOFRAN-ODT) disintegrating tablet 4 mg, Q8H PRN   Or  ondansetron (ZOFRAN) injection 4 mg, Q6H PRN  polyethylene glycol (GLYCOLAX) packet 17 g, Daily PRN  acetaminophen (TYLENOL) tablet 650 mg, Q6H PRN   Or  acetaminophen (TYLENOL) suppository 650 mg, Q6H PRN  ceFAZolin (ANCEF) 1,000 mg in dextrose 5 % 50 mL IVPB (mini-bag), Q8H  dextrose bolus 10% 125 mL, PRN   Or  dextrose bolus 10% 250 mL, PRN  glucagon (rDNA) injection 1 mg, PRN  dextrose 5 % solution, PRN  losartan (COZAAR) tablet 50 mg, BID  insulin glargine (LANTUS) injection vial 20 Units, Nightly  insulin lispro (HUMALOG) injection vial 0-12 Units, TID WC  insulin lispro (HUMALOG) injection vial 0-6 Units, Nightly  insulin lispro (HUMALOG) injection vial 5 Units, TID WC  HYDROcodone-acetaminophen (NORCO) 5-325 MG per tablet 1 tablet, Q6H PRN  mupirocin (BACTROBAN) 2 % ointment, Daily  nicotine (NICODERM CQ) 21 MG/24HR 1 patch, Daily        Objective:  BP (!) 93/57   Pulse 93   Temp 97.9 °F (36.6 °C) (Oral)   Resp 18   Ht 5' 8\" (1.727 m)   Wt 165 lb 12.8 oz (75.2 kg)   SpO2 95%   BMI 25.21 kg/m²     Intake/Output Summary (Last 24 hours) at 6/20/2022 1652  Last data filed at 6/20/2022 1400  Gross per 24 hour   Intake --   Output 350 ml   Net -350 ml      Wt Readings from Last 3 Encounters:   06/19/22 165 lb 12.8 oz (75.2 kg)       General appearance:  Appears comfortable. Disheveled. Eyes: Sclera clear. Pupils equal.  ENT: Moist oral mucosa. Trachea midline, no adenopathy. Cardiovascular: Regular rhythm, normal S1, S2. No murmur. No edema in lower extremities  Respiratory: Not using accessory muscles. Good inspiratory effort. Clear to auscultation bilaterally, no wheeze or crackles. GI: Abdomen soft, no tenderness, not distended, normal bowel sounds  Musculoskeletal: L aka  Neurology: CN 2-12 grossly intact. No speech or motor deficits  Psych: Normal affect. Alert and oriented in time, place and person  Skin: cellulitis R great toe, several scabbed areas. Superficial ulcer lateral R leg.  No surrounding cellulitis    Labs and Tests:  CBC:   Recent Labs     06/18/22 2241 06/20/22  0642   WBC 12.2* 10.3   HGB 13.4* 11.8*   * 383     BMP:    Recent Labs     06/18/22 2241 06/20/22  0642    133*   K 4.8 3.7    101   CO2 25 22   BUN 18 26*   CREATININE 0.8 0.8   GLUCOSE 456* 242*     Hepatic:   Recent Labs     06/18/22  2241   AST 13*   ALT 10   BILITOT 0.3   ALKPHOS 264*     Cardiology note from  [place] at [facility].    Stress examination with small apical reperfusion defect. Risk to benefit not in favor of invasive cardiac catheterization. Will concentrate on hypertensive urgency as presenting picture and maximizing medical therapy to assist with pressure control and improvement LVEF. Losartan increased to twice daily today, will observe clinical response. Problem List  Principal Problem:    Right hallux infection  Active Problems:    Open wound of right great toe    Frequent falls    Closed fracture of multiple ribs of right side with routine healing    Status post above knee amputation, left (HCC)  Resolved Problems:    * No resolved hospital problems. *       Assessment & Plan:   1. R great toe cellulitis-change to zosyn. Check Nares for mrsa. Xray R foot  2. UTI-add zosyn. 3. Multiple rib fractures-on norco. Encourage IS. 4. Dm 2-uncontrolled. Continue lantus 20,  5 of humalog plus SSI. 5. CAD-had recent abnormal stress test however cath deferred for maximizing medical therapy. 6. Disposition- PT/OTconsult pending. Currently living in Extended stay hotel. He apparently is out of days at snf. Diet: ADULT DIET;  Regular; 5 carb choices (75 gm/meal)  Code:Full Code  DVT PPX: lovenox      Blaine Hackett PA-C   6/20/2022 4:52 PM

## 2022-06-20 NOTE — PROGRESS NOTES
[facility] Department   Phone: [phone]    Physical Therapy    [x] Initial Evaluation            [] Daily Treatment Note         [] Discharge Summary      Patient: Donald Olea   : 1960   MRN: [id number]   Date of Service:  2022  Admitting Diagnosis: Toe infection  Current Admission Summary: The patient is a 63 y.o. male with history of CAD, type 2 diabetes, emphysema, GERD, hyperlipidemia, hypertension, peripheral vascular disease who had a recent left AKA. He comes from home following recurrent falls and complaining of right chest discomfort. He fell multiple times. Patient was recently discharged from the hospital and recommended for rehab however he declined. He wishes to go to rehab at discharge. No significant cough or production, no fevers or chills. Past Medical History:  has a past medical history of CAD (coronary artery disease), Diabetes mellitus ([address] Empyema ([address] GERD (gastroesophageal reflux disease), Hyperlipidemia, Hypertension, Low back pain, MI (myocardial infarction) ([address] PVD (peripheral vascular disease) ([facility] Sleep apnea, and TIA (transient ischemic attack). Past Surgical History:  has a past surgical history that includes Coronary artery bypass graft; Toe amputation (Right); Back Injection; other surgical history; sigmoidoscopy (N/A, 2021); and Upper gastrointestinal endoscopy (N/A, 2021). Discharge Recommendations: Donald Olea scored a 8/24 on the AM-PAC short mobility form. Current research shows that an AM-PAC score of 17 or less is typically not associated with a discharge to the patient's home setting. Based on the patient's AM-PAC score and their current functional mobility deficits, it is recommended that the patient have 3-5 sessions per week of Physical Therapy at d/c to increase the patient's independence. Please see assessment section for further patient specific details.     If patient discharges prior to next session this note will serve as a discharge summary. Please see below for the latest assessment towards goals. DME Required For Discharge: DME to be determined at next level of care  Precautions/Restrictions: high fall risk  Weight Bearing Restrictions: weight bearing as tolerated  [] Right Upper Extremity  [] Left Upper Extremity [] Right Lower Extremity  [] Left Lower Extremity     Required Braces/Orthotics: no braces required   [] Right  [] Left  Positional Restrictions:no positional restrictions    Pre-Admission Information   Lives With: spouse    Type of Home: hotel  Home Layout: one level, able to live on main level  Home Access: level entry  Bathroom Layout: tub/shower unit, . Comment: Pt reports bathroom is not accessible, uses bedside commode, pt sits on bed for spongebaths  Toilet Height: standard height  Bathroom Equipment: . Comment: Pt is unsure  Home Equipment: rolling walker, manual wheelchair  Transfer Assistance: modified independent with use of slideboard, caregivers  Ambulation Assistance:requires assistance, . Comment: with WC  ADL Assistance: requires assistance with bathing, requires assistance with dressing, requires assistance with feeding, requires assistance with toileting  IADL Assistance: requires assistance with all homemaking tasks  Active :        [] Yes  [x] No  Hand Dominance: [] Left  [] Right  Current Employment: retired. Occupation: computers for the [employer]:   Recent Falls: 4     Examination   Vision:   Vision Gross Assessment: WFL  Hearing:   hard of hearing  Observation:   General Observation:  Pt semi-reclined in bed, leaning heavily to L side. Catheter, IV  Posture:    Forward head rounded shoulders  Sensation:   reports numbness and tingling in (B) UE  Proprioception:    WFL  Tone:   Normotonic  Coordination Testing:   Finger/Thumb Opposition: Impaired    ROM:   (B) LE AROM WFL  Strength:   (B) LE strength grossly WFL  Decision Making: high complexity  Clinical Presentation: unstable      Subjective  General: Pt semi-reclined in bed upon therapist arrival leaning to the L. Pt was difficult to wake, however agreeable to PT/OT evaluation. Pain: 10/10. Location: R ribs and low back  Pain Interventions: RN notified of patient request for pain medication       Functional Mobility  Bed Mobility  Supine to Sit: dependent assistance  Rolling Left: contact guard assistance  Rolling Right: maximum assistance  Comments: Pt reports pain in R ribs, does not follow commands for hand placement and performing bed mobility. Transfers  Slide board transfer: contact guard assistance  Comments: From bed to recliner; pt refusing to allow PT to assist in slide board transfer. Multiple small short scoots to recliner  Ambulation  Ambulation not tested on this date. Distance:   Gait Mechanics:   Comments:    Stair Mobility  Stair mobility not completed on this date. Comments:  Wheelchair Mobility:  No w/c mobility completed on this date. Comments:  Balance  Static Sitting Balance: fair (-): maintains balance at min assist with use of UE support  Dynamic Sitting Balance: fair (-): maintains balance at min assist with use of UE support  Comments:    Other Therapeutic Interventions  ADLs - see OT note  Functional Outcomes  AM-PAC Inpatient Mobility Raw Score : 8              Cognition  Overall Cognitive Status: Impaired  Following Commands: inconsistently follows commands  Comments: Pt demonstrates emotional lability throughout therapy session fluctuating between making jokes, inappropriate comments, threatening comments towards therapist, and an overall tangential thought process. Pt was resistive to any kind of movement, at times asking for therapist to \"do it all for him\" in terms of transfers, however then stating that therapist was \"hitting him\".    Orientation:    alert and oriented x 4  Command Following:   impaired    Education  Barriers To Learning: cognition, hearing and emotional  Patient Education: patient educated on PT role and benefits, plan of care, general safety, functional mobility training, proper use of assistive device/equipment, injury prevention, transfer training, discharge recommendations  Learning Assessment:  patient resistive towards education topics within session, would benefit from continued education    Assessment  Activity Tolerance: Pt demonstrates reduced activity tolerance throughout session by reporting high pain levels throughout therapy. Additionally, pt reports lightheadedness and dizziness upon sitting at EOB. Impairments Requiring Therapeutic Intervention: decreased functional mobility, decreased ADL status, decreased ROM, decreased strength, decreased cognition, decreased endurance, decreased sensation, decreased balance, decreased IADL, decreased coordination, increased pain, decreased posture  Prognosis: fair  Clinical Assessment: Pt is a 63 y.o male who presents with the above impairments today. Pt was limited primarily by pain and cognitive barriers today, as pain was 10/10 upon therapist arrival and after nurse administered pain medication. Positive prognostic factors include support of wife, while negative prognostic factors include cognition, high pain level, and current living situation in hotel. At this point in care, discharge to SNF is recommended to continue to improve the pt's functional mobility.    Safety Interventions: patient left in chair, chair alarm in place, call light within reach, gait belt, patient at risk for falls and nurse notified    Plan  Frequency: 3-5 x/per week  Current Treatment Recommendations: strengthening, ROM, balance training, functional mobility training, transfer training, endurance training, wheelchair mobility training, patient/caregiver education, ADL/self-care training, pain management, home exercise program and safety education  Goals  Patient Goals: Not verbalized Short Term Goals:  Time Frame: Prior to D/C  Patient will complete bed mobility at modified independent   Patient will complete transfers at [facility] independent   Patient will complete car transfer at modified [facility]    Therapy Session Time      Individual Group Co-treatment   Time In     1107   Time Out     1232   Minutes     85     Timed Code Treatment Minutes:  70 Minutes  Total Treatment Minutes:  85     Kim Guzmán, SPT  PT providing direct supervision during session and assisting in making skilled judgements throughout session.   Electronically Signed By: Kurtis Parkinson, BERNARD Parkinson PT, DPT, [id number]

## 2022-06-20 NOTE — PROGRESS NOTES
Physician Progress Note      PATIENT:               Doris Romero  [facility] #:                  [id number]  :                       1960  ADMIT DATE:       2022 10:23 PM  DISCH DATE:  RESPONDING  PROVIDER #:        Jun Arriaga PA-C          QUERY TEXT:    Pt admitted with right hallux cellulitis. Pt noted to have DM 2 with   hyperglycemia. If possible, please document in progress notes and discharge   summary the relationship, if any, between cellulitis and DM. The medical record reflects the following:  Risk Factors: DM, recent AKA, PVD  Clinical Indicators: Glucose on admit- 456, Per H&P- \"right great toe wound   with cellulitis, not able to care at home. Right hallux wound with erythema. \"    Per Podiatry consult- \"Pulses diminished R LE. Edema and erythema noted to   the medial R hallux. Derm: Multiple areas that are covered in scab noted to   the R hallux. \"  Treatment: Labs, right foot x-ray, Podiatry consult, Wound care consult, abx   ointment and dry dressing. Options provided:  -- Right hallux cellulitis associated with Diabetes  -- Right hallux cellulitis unrelated to Diabetes  -- Other - I will add my own diagnosis  -- Disagree - Not applicable / Not valid  -- Disagree - Clinically unable to determine / Unknown  -- Refer to Clinical Documentation Reviewer    PROVIDER RESPONSE TEXT:    Right hallux cellulitis associated with Diabetes.     Query created by: Austen Sevilla on 2022 9:25 AM      Electronically signed by:  Jun Arriaga PA-C 2022 11:34 AM

## 2022-06-20 NOTE — CARE COORDINATION
Discharge Planning Assessment  Readmission Risk:     RN/SW discharge planner met with patient/ (and family member) to discuss reason for admission, current living situation, and potential needs at the time of discharge. Demographics/Insurance verified (address, phone, insurance):   Pt reports that current address on file is incorrect. Pt and spouse are living in an Extended-Stay in [place]    Current type of dwelling:  [employer], main level, ) [place]    Living arrangements:  w/spouse    Level of function/Support:  Needs assist (Lt AKA)    PCP:   Sherri Paredes [place]       Last Visit to PCP:  05/12/2022    DME (physical):  KIM, RW - Pt reports that he could benefit from crutches    DME (medical):  none    Community services/support (COA, HH, etc): None    Medication compliance issues:  Pt reports compliance    Financial issues that could impact healthcare:  Fixed income    Transportation at the time of discharge:  Medical vs spouse    Tentative discharge plan:   TBD, PT/OT evals pending    Discussed with patient and/or family that on the day of discharge home tentative time of discharge will be between 10 AM and noon.     Electronically signed by Jaguar Matos RN on 6/20/2022 at 12:45 PM

## 2022-06-20 NOTE — ED PROVIDER NOTES
eMERGENCY dEPARTMENT eNCOUnter      [address] [facility]    Chief Complaint   Patient presents with    Fall     Patient in via [address] [facility] for multiple calls for falls this evening. Patient states he has lower back pain as well and side pain. JOE Segal is a 63 y.o. male who presents to the ER for evaluation of multiple complaints including multiple frequent falls over the last several weeks, he is 2 months post left AKA, now with persistent right foot phalanx infections multiple frequent falls complained of severe right chest wall pain after falls, positive headache, positive anticoagulation. Positive increasing gait disorder falls. He was referred to a skilled nursing facility and declined this status post recent hospitalization. He is unable to perform activities of daily living with frequent falls. Positive weakness. PAST MEDICAL HISTORY    Past Medical History:   Diagnosis Date    CAD (coronary artery disease)     Diabetes mellitus ([address]     Empyema ([address]     GERD (gastroesophageal reflux disease)     Hyperlipidemia     Hypertension     Low back pain     MI (myocardial infarction) ([address]     PVD (peripheral vascular disease) ([address]     Sleep apnea     TIA (transient ischemic attack)        SURGICAL HISTORY    Past Surgical History:   Procedure Laterality Date    BACK INJECTION      CORONARY ARTERY BYPASS GRAFT      OTHER SURGICAL HISTORY      right leg vascular stents    SIGMOIDOSCOPY N/A 4/19/2021    SIGMOIDOSCOPY DIAGNOSTIC FLEXIBLE performed by Huong Torre MD at [facility] Right     UPPER GASTROINTESTINAL ENDOSCOPY N/A 4/19/2021    EGD BIOPSY performed by Huong Torre MD at [address] [place] [address]    Allergies   Allergen Reactions    Amiodarone     Bactrim [Sulfamethoxazole-Trimethoprim] Hives       FAMILY HISTORY    History reviewed. No pertinent family history.     SOCIAL HISTORY    Social History     Socioeconomic History    Marital status:      Spouse name: None    Number of children: None    Years of education: None    Highest education level: None   Occupational History    None   Tobacco Use    Smoking status: Current Every Day Smoker     Packs/day: 2.00     Years: 40.00     Pack years: 80.00    Smokeless tobacco: Never Used   Vaping Use    Vaping Use: Never used   Substance and Sexual Activity    Alcohol use: Not Currently    Drug use: Never    Sexual activity: Yes   Other Topics Concern    None   Social History Narrative    None     Social Determinants of Health     Financial Resource Strain:     Difficulty of Paying Living Expenses: Not on file   Food Insecurity:     Worried About Running Out of Food in the Last Year: Not on file    Gary of Food in the Last Year: Not on file   Transportation Needs:     Lack of Transportation (Medical): Not on file    Lack of Transportation (Non-Medical):  Not on file   Physical Activity:     Days of Exercise per Week: Not on file    Minutes of Exercise per Session: Not on file   Stress:     Feeling of Stress : Not on file   Social Connections:     Frequency of Communication with Friends and Family: Not on file    Frequency of Social Gatherings with Friends and Family: Not on file    Attends Christianity Services: Not on file    Active Member of [address] [place] [address] or Organizations: Not on file    Attends Club or Organization Meetings: Not on file    Marital Status: Not on file   Intimate Partner Violence:     Fear of Current or Ex-Partner: Not on file    Emotionally Abused: Not on file    Physically Abused: Not on file    Sexually Abused: Not on file   Housing Stability:     Unable to Pay for Housing in the Last Year: Not on file    Number of [place] in the Last Year: Not on file    Unstable Housing in the Last Year: Not on file       REVIEW OF SYSTEMS    Constitutional:  Denies fever, chills, weight loss or weakness   Eyes: Denies photophobia or discharge   HENT:  Denies sore throat or ear pain   Respiratory:  Denies cough or shortness of breath   Cardiovascular:  Denies chest pain, palpitations or swelling   GI:  Denies abdominal pain, nausea, vomiting, or diarrhea   Musculoskeletal:  Denies back pain   Skin:  Denies rash   Neurologic:  Denies headache, focal weakness or sensory changes   Endocrine:  Denies polyuria or polydypsia   Lymphatic:  Denies swollen glands   Psychiatric:  Denies depression, suicidal ideation or homicidal ideation   All systems negative except as marked. PHYSICAL EXAM    VITAL SIGNS: /81   Pulse 72   Temp 97.8 °F (36.6 °C) (Oral)   Resp 16   Ht 5' 8\" (1.727 m)   Wt 165 lb 12.8 oz (75.2 kg)   SpO2 96%   BMI 25.21 kg/m²    Constitutional: Ill-appearing, no acute severe distress  HENT:  Normocephalic, Atraumatic, Bilateral external ears normal, Oropharynx moist, No oral exudates, Nose normal. Neck- Normal range of motion, No tenderness, Supple, No stridor. Eyes:  PERRL, EOMI, Conjunctiva normal, No discharge. Respiratory:  Normal breath sounds, No respiratory distress, No wheezing, positive right chest tenderness. Cardiovascular:  Normal heart rate, Normal rhythm, No murmurs, No rubs, No gallops. GI:  Bowel sounds normal, Soft, No tenderness, No masses, No pulsatile masses. : External genitalia appear normal, No masses or lesions. No discharge. No CVA tenderness. Musculoskeletal: Left AKA with no stump infection, distal second and third phalanx infection right foot  Integument:  See MS   Lymphatic:  No lymphadenopathy noted. Neurologic:  Alert & oriented x 3, Normal motor function, Normal sensory function, No focal deficits noted.    Psychiatric:  Affect normal, Judgment normal, Mood normal.       RADIOLOGY    cT head: No intracranial hemorrhage  CT chest: Multiple rib fractures right closed nonhealed: No pneumothorax    PROCEDURES    none    ED COURSE & MEDICAL DECISION MAKING Pertinent Labs & Imaging studies reviewed. (See chart for details)  Patient presents to the ER for evaluation recurrent falls, gait disorder failure to thrive, diabetes with now great right toe infection, without signs of sepsis, with persisting gait disorder failure to thrive needs skilled nursing facility. Analgesia provided, case discussed with admitting medical staff    FINAL IMPRESSION    1. FTT (failure to thrive) in adult    2. Recurrent falls    3. Amputation of left lower extremity above knee upon examination ([facility]    4. Blister of toe with infection, right, initial encounter    5.  Closed fracture of multiple ribs of right side, initial encounter            Cherelle Melendrez MD  44/[id number]/53 [id number]

## 2022-06-20 NOTE — CARE COORDINATION
Patient has wound care consult for wound to right hallux. Patient seen by Dr Erin Proctor (Podiatry) on 6/19. Orders were written for application of bactroban ointment to affected area and daily dry dressing. No new orders at this time.  QUINN Dawson, BSN, RN, [facility], [facility]  [phone]

## 2022-06-20 NOTE — CARE COORDINATION
CM spoke with spouse via phone today. Per wife, she and Pt live in an extended-stay hotel in [place]. Wife reports that pt has been in [address] and [address] [facility] experience. \"  Wife reports being in contact with care liaison thru insurance and pt has used all skilled days for 2022 and either had to pay OOP or transition to LTC. Wife reports they cannot go to LTC because \"it would require giving up the SS check and leave her without any income to live. \"  Wife also reports that they plan to return to a hotel near the [facility], closer to his [address] team.     Wife had questions about palliative assistance and asked for pamphlets be dropped off in room. Wife will be on site later to see pt. CM spoke to Yadi Tomas in palliative. Yadi Tomas will provide palliative resources. DC plan will be for pt to return home (hotel) with wife. She plans to move them by end of this week therefore [facility] locally is not needed.      Electronically signed by Chalino Miranda RN on 6/20/2022 at 1:20 PM

## 2022-06-21 LAB
GLUCOSE BLD-MCNC: 122 MG/DL (ref 70–99)
GLUCOSE BLD-MCNC: 155 MG/DL (ref 70–99)
GLUCOSE BLD-MCNC: 178 MG/DL (ref 70–99)
GLUCOSE BLD-MCNC: 224 MG/DL (ref 70–99)
L. PNEUMOPHILA SEROGP 1 UR AG: NORMAL
PERFORMED ON: ABNORMAL

## 2022-06-21 PROCEDURE — 87081 CULTURE SCREEN ONLY: CPT

## 2022-06-21 PROCEDURE — 6370000000 HC RX 637 (ALT 250 FOR IP): Performed by: NURSE PRACTITIONER

## 2022-06-21 PROCEDURE — 97535 SELF CARE MNGMENT TRAINING: CPT

## 2022-06-21 PROCEDURE — 87186 SC STD MICRODIL/AGAR DIL: CPT

## 2022-06-21 PROCEDURE — 6370000000 HC RX 637 (ALT 250 FOR IP): Performed by: HOSPITALIST

## 2022-06-21 PROCEDURE — 97530 THERAPEUTIC ACTIVITIES: CPT

## 2022-06-21 PROCEDURE — 6360000002 HC RX W HCPCS: Performed by: PHYSICIAN ASSISTANT

## 2022-06-21 PROCEDURE — 6360000002 HC RX W HCPCS: Performed by: INTERNAL MEDICINE

## 2022-06-21 PROCEDURE — 86403 PARTICLE AGGLUT ANTBDY SCRN: CPT

## 2022-06-21 PROCEDURE — 87077 CULTURE AEROBIC IDENTIFY: CPT

## 2022-06-21 PROCEDURE — 2580000003 HC RX 258: Performed by: PHYSICIAN ASSISTANT

## 2022-06-21 PROCEDURE — 87070 CULTURE OTHR SPECIMN AEROBIC: CPT

## 2022-06-21 PROCEDURE — 6370000000 HC RX 637 (ALT 250 FOR IP): Performed by: INTERNAL MEDICINE

## 2022-06-21 PROCEDURE — 1200000000 HC SEMI PRIVATE

## 2022-06-21 PROCEDURE — 6370000000 HC RX 637 (ALT 250 FOR IP): Performed by: PHYSICIAN ASSISTANT

## 2022-06-21 PROCEDURE — 87205 SMEAR GRAM STAIN: CPT

## 2022-06-21 PROCEDURE — 99223 1ST HOSP IP/OBS HIGH 75: CPT | Performed by: INTERNAL MEDICINE

## 2022-06-21 PROCEDURE — 2580000003 HC RX 258: Performed by: INTERNAL MEDICINE

## 2022-06-21 RX ORDER — LINEZOLID 600 MG/1
600 TABLET, FILM COATED ORAL EVERY 12 HOURS SCHEDULED
Status: DISCONTINUED | OUTPATIENT
Start: 2022-06-21 | End: 2022-06-24 | Stop reason: HOSPADM

## 2022-06-21 RX ORDER — OXYCODONE HYDROCHLORIDE AND ACETAMINOPHEN 5; 325 MG/1; MG/1
1 TABLET ORAL EVERY 4 HOURS PRN
Status: DISCONTINUED | OUTPATIENT
Start: 2022-06-21 | End: 2022-06-24 | Stop reason: HOSPADM

## 2022-06-21 RX ADMIN — HYDROCODONE BITARTRATE AND ACETAMINOPHEN 1 TABLET: 5; 325 TABLET ORAL at 10:03

## 2022-06-21 RX ADMIN — OXYCODONE AND ACETAMINOPHEN 1 TABLET: 5; 325 TABLET ORAL at 14:18

## 2022-06-21 RX ADMIN — LOSARTAN POTASSIUM 50 MG: 25 TABLET, FILM COATED ORAL at 07:54

## 2022-06-21 RX ADMIN — DULOXETINE HYDROCHLORIDE 30 MG: 30 CAPSULE, DELAYED RELEASE ORAL at 07:55

## 2022-06-21 RX ADMIN — PIPERACILLIN AND TAZOBACTAM 3375 MG: 3; .375 INJECTION, POWDER, LYOPHILIZED, FOR SOLUTION INTRAVENOUS at 20:24

## 2022-06-21 RX ADMIN — INSULIN LISPRO 5 UNITS: 100 INJECTION, SOLUTION INTRAVENOUS; SUBCUTANEOUS at 07:56

## 2022-06-21 RX ADMIN — INSULIN LISPRO 2 UNITS: 100 INJECTION, SOLUTION INTRAVENOUS; SUBCUTANEOUS at 07:59

## 2022-06-21 RX ADMIN — INSULIN LISPRO 5 UNITS: 100 INJECTION, SOLUTION INTRAVENOUS; SUBCUTANEOUS at 17:38

## 2022-06-21 RX ADMIN — METOPROLOL SUCCINATE 50 MG: 50 TABLET, EXTENDED RELEASE ORAL at 07:54

## 2022-06-21 RX ADMIN — DULOXETINE HYDROCHLORIDE 30 MG: 30 CAPSULE, DELAYED RELEASE ORAL at 20:16

## 2022-06-21 RX ADMIN — ATORVASTATIN CALCIUM 80 MG: 80 TABLET, FILM COATED ORAL at 07:54

## 2022-06-21 RX ADMIN — PREGABALIN 150 MG: 75 CAPSULE ORAL at 20:16

## 2022-06-21 RX ADMIN — INSULIN LISPRO 4 UNITS: 100 INJECTION, SOLUTION INTRAVENOUS; SUBCUTANEOUS at 11:49

## 2022-06-21 RX ADMIN — INSULIN LISPRO 5 UNITS: 100 INJECTION, SOLUTION INTRAVENOUS; SUBCUTANEOUS at 11:50

## 2022-06-21 RX ADMIN — INSULIN LISPRO 1 UNITS: 100 INJECTION, SOLUTION INTRAVENOUS; SUBCUTANEOUS at 20:20

## 2022-06-21 RX ADMIN — CLOPIDOGREL BISULFATE 75 MG: 75 TABLET ORAL at 07:54

## 2022-06-21 RX ADMIN — LINEZOLID 600 MG: 600 TABLET, FILM COATED ORAL at 20:16

## 2022-06-21 RX ADMIN — PREGABALIN 150 MG: 75 CAPSULE ORAL at 07:55

## 2022-06-21 RX ADMIN — OXYCODONE AND ACETAMINOPHEN 1 TABLET: 5; 325 TABLET ORAL at 20:16

## 2022-06-21 RX ADMIN — MUPIROCIN: 20 OINTMENT TOPICAL at 07:55

## 2022-06-21 RX ADMIN — ENOXAPARIN SODIUM 40 MG: 100 INJECTION SUBCUTANEOUS at 07:55

## 2022-06-21 RX ADMIN — INSULIN GLARGINE 20 UNITS: 100 INJECTION, SOLUTION SUBCUTANEOUS at 20:20

## 2022-06-21 RX ADMIN — ASPIRIN 81 MG: 81 TABLET, COATED ORAL at 07:54

## 2022-06-21 RX ADMIN — PIPERACILLIN AND TAZOBACTAM 3375 MG: 3; .375 INJECTION, POWDER, LYOPHILIZED, FOR SOLUTION INTRAVENOUS at 05:01

## 2022-06-21 RX ADMIN — PIPERACILLIN AND TAZOBACTAM 3375 MG: 3; .375 INJECTION, POWDER, LYOPHILIZED, FOR SOLUTION INTRAVENOUS at 11:54

## 2022-06-21 RX ADMIN — SPIRONOLACTONE 25 MG: 25 TABLET ORAL at 07:54

## 2022-06-21 RX ADMIN — Medication 10 ML: at 09:25

## 2022-06-21 ASSESSMENT — PAIN SCALES - GENERAL
PAINLEVEL_OUTOF10: 0
PAINLEVEL_OUTOF10: 9
PAINLEVEL_OUTOF10: 0
PAINLEVEL_OUTOF10: 10
PAINLEVEL_OUTOF10: 9

## 2022-06-21 ASSESSMENT — ENCOUNTER SYMPTOMS
SORE THROAT: 0
BACK PAIN: 0
CONSTIPATION: 0
TROUBLE SWALLOWING: 0
SHORTNESS OF BREATH: 0
ABDOMINAL PAIN: 0
EYE REDNESS: 0
DIARRHEA: 0
ROS SKIN COMMENTS: RIGHT LEG WOUND
EYE DISCHARGE: 0
NAUSEA: 0
RHINORRHEA: 0
WHEEZING: 0
COUGH: 0

## 2022-06-21 ASSESSMENT — PAIN DESCRIPTION - ORIENTATION: ORIENTATION: RIGHT

## 2022-06-21 ASSESSMENT — PAIN DESCRIPTION - LOCATION: LOCATION: RIB CAGE

## 2022-06-21 ASSESSMENT — PAIN DESCRIPTION - DESCRIPTORS: DESCRIPTORS: ACHING

## 2022-06-21 NOTE — PROGRESS NOTES
[facility] [facility]   Phone: [phone]    Physical Therapy    [] Initial Evaluation            [x] Daily Treatment Note         [] Discharge Summary      Patient: Donald Olea   : 1960   MRN: [id number]   Date of Service:  2022  Admitting Diagnosis: Toe infection  Current Admission Summary: The patient is a 63 y.o. male with history of CAD, type 2 diabetes, emphysema, GERD, hyperlipidemia, hypertension, peripheral vascular disease who had a recent left AKA. He comes from home following recurrent falls and complaining of right chest discomfort. He fell multiple times. Patient was recently discharged from the hospital and recommended for rehab however he declined. He wishes to go to rehab at discharge. No significant cough or production, no fevers or chills. Past Medical History:  has a past medical history of CAD (coronary artery disease), Diabetes mellitus ([address] Empyema ([address] GERD (gastroesophageal reflux disease), Hyperlipidemia, Hypertension, Low back pain, MI (myocardial infarction) ([address] PVD (peripheral vascular disease) ([facility] [address] [facility] Sleep apnea, and TIA (transient ischemic attack). Past Surgical History:  has a past surgical history that includes Coronary artery bypass graft; Toe amputation (Right); Back Injection; other surgical history; sigmoidoscopy (N/A, 2021); and Upper gastrointestinal endoscopy (N/A, 2021). Discharge Recommendations: Donald Olea scored a 7/24 on the AM-PAC short mobility form. Current research shows that an AM-PAC score of 17 or less is typically not associated with a discharge to the patient's home setting. Based on the patient's AM-PAC score and their current functional mobility deficits, it is recommended that the patient have 3-5 sessions per week of Physical Therapy at d/c to increase the patient's independence. Please see assessment section for further patient specific details.     If patient discharges prior to next session this note will serve as a discharge summary. Please see below for the latest assessment towards goals. DME Required For Discharge: DME to be determined at next level of care  Precautions/Restrictions: high fall risk  Weight Bearing Restrictions: weight bearing as tolerated  [] Right Upper Extremity  [] Left Upper Extremity [] Right Lower Extremity  [] Left Lower Extremity     Required Braces/Orthotics: no braces required   [] Right  [] Left  Positional Restrictions:no positional restrictions    Pre-Admission Information   Lives With: spouse    Type of Home: hotel  Home Layout: one level, able to live on main level  Home Access: level entry  Bathroom Layout: tub/shower unit, . Comment: Pt reports bathroom is not accessible, uses bedside commode, pt sits on bed for spongebaths  Toilet Height: standard height  Bathroom Equipment: . Comment: Pt is unsure  Home Equipment: rolling walker, manual wheelchair  Transfer Assistance: modified independent with use of slideboard, caregivers  Ambulation Assistance:requires assistance, . Comment: with WC  ADL Assistance: requires assistance with bathing, requires assistance with dressing, requires assistance with feeding, requires assistance with toileting  IADL Assistance: requires assistance with all homemaking tasks  Active :        [] Yes  [x] No  Hand Dominance: [] Left  [] Right  Current Employment: retired. Occupation: computers for the [employer]:   Recent Falls: 4       Subjective  General: Pt supine in bed upon PT/OT arrival, rates 8/10 pain in ribs and buttock, requesting pain medication. RN notified and administers pain medication. Pt agreeable to treatment session with initial encouragement. Pain: 8/10. Location: ribs and buttock and 10/10.   Location: ribs and buttock  Pain Interventions: RN notified of patient request for pain medication       Functional Mobility  Bed Mobility  Supine to Sit: 2 person cognition, hearing and emotional  Patient Education: patient educated on PT role and benefits, plan of care, general safety, functional mobility training, proper use of assistive device/equipment, injury prevention, transfer training, discharge recommendations  Learning Assessment:  patient resistive towards education topics within session, would benefit from continued education    Assessment  Activity Tolerance: Pt demonstrates reduced activity tolerance throughout session by reporting high pain levels throughout therapy. Pt presents limited due to safety awareness concerns during sessions  Impairments Requiring Therapeutic Intervention: decreased functional mobility, decreased ADL status, decreased ROM, decreased strength, decreased cognition, decreased endurance, decreased sensation, decreased balance, decreased IADL, decreased coordination, increased pain, decreased posture  Prognosis: fair  Clinical Assessment: Pt tolerates therapy session fair this date, presents limited due to behavioral concerns. Pt stating inappropriate comments throughout session, easily distractible and tangential during therapy session. Pt requires max A x 2 for supine to sit transfer and dependent x 2 assistance for slideboard transfers due to behavioral concerns. Pt will continue to benefit from skilled therapy services to continue progressing independence with mobility.    Safety Interventions: patient left in chair, chair alarm in place, call light within reach, gait belt, patient at risk for falls and nurse notified    Plan  Frequency: 3-5 x/per week  Current Treatment Recommendations: strengthening, ROM, balance training, functional mobility training, transfer training, endurance training, wheelchair mobility training, patient/caregiver education, ADL/self-care training, pain management, home exercise program and safety education  Goals  Patient Goals: Not verbalized   Short Term Goals:  Time Frame: Prior to D/C  Patient will complete bed mobility at modified independent   Patient will complete transfers at [facility]   Patient will complete car transfer at [facility]    Therapy Session Time      Individual Group Co-treatment   Time In     0953   Time Out     1033   Minutes     40     Timed Code Treatment Minutes:   40 minutes  Total Treatment Minutes:  40 minutes     Electronically Signed By: Mike Coe. [facility], DPT [id number]

## 2022-06-21 NOTE — PLAN OF CARE
Problem: Pain  Goal: Verbalizes/displays adequate comfort level or baseline comfort level  Outcome: Progressing  Flowsheets  Taken 6/20/2022 2330  Verbalizes/displays adequate comfort level or baseline comfort level:   Encourage patient to monitor pain and request assistance   Assess pain using appropriate pain scale  Taken 6/20/2022 2015  Verbalizes/displays adequate comfort level or baseline comfort level:   Encourage patient to monitor pain and request assistance   Assess pain using appropriate pain scale     Problem: Safety - Adult  Goal: Free from fall injury  Outcome: Progressing  Flowsheets (Taken 6/21/2022 0204)  Free From Fall Injury:   Instruct family/caregiver on patient safety   Based on caregiver fall risk screen, instruct family/caregiver to ask for assistance with transferring infant if caregiver noted to have fall risk factors     Problem: Skin/Tissue Integrity  Goal: Absence of new skin breakdown  Description: 1. Monitor for areas of redness and/or skin breakdown  2. Assess vascular access sites hourly  3. Every 4-6 hours minimum:  Change oxygen saturation probe site  4. Every 4-6 hours:  If on nasal continuous positive airway pressure, respiratory therapy assess nares and determine need for appliance change or resting period.   Outcome: Progressing     Problem: Chronic Conditions and Co-morbidities  Goal: Patient's chronic conditions and co-morbidity symptoms are monitored and maintained or improved  Outcome: Progressing  Flowsheets (Taken 6/20/2022 2000)  Care Plan - Patient's Chronic Conditions and Co-Morbidity Symptoms are Monitored and Maintained or Improved:   Monitor and assess patient's chronic conditions and comorbid symptoms for stability, deterioration, or improvement   Collaborate with multidisciplinary team to address chronic and comorbid conditions and prevent exacerbation or deterioration   Update acute care plan with appropriate goals if chronic or comorbid symptoms are exacerbated and prevent overall improvement and discharge     Problem: Skin/Tissue Integrity  Goal: Absence of new skin breakdown  Description: 1. Monitor for areas of redness and/or skin breakdown  2. Assess vascular access sites hourly  3. Every 4-6 hours minimum:  Change oxygen saturation probe site  4. Every 4-6 hours:  If on nasal continuous positive airway pressure, respiratory therapy assess nares and determine need for appliance change or resting period. Outcome: Progressing     Problem: Chronic Conditions and Co-morbidities  Goal: Patient's chronic conditions and co-morbidity symptoms are monitored and maintained or improved  Outcome: Progressing  Flowsheets (Taken 6/20/2022 2000)  Care Plan - Patient's Chronic Conditions and Co-Morbidity Symptoms are Monitored and Maintained or Improved:   Monitor and assess patient's chronic conditions and comorbid symptoms for stability, deterioration, or improvement   Collaborate with multidisciplinary team to address chronic and comorbid conditions and prevent exacerbation or deterioration   Update acute care plan with appropriate goals if chronic or comorbid symptoms are exacerbated and prevent overall improvement and discharge     Problem: Neurosensory - Adult  Goal: Achieves stable or improved neurological status  Outcome: Progressing  Flowsheets (Taken 6/20/2022 2000)  Achieves stable or improved neurological status:   Assess for and report changes in neurological status   Initiate measures to prevent increased intracranial pressure   Maintain blood pressure and fluid volume within ordered parameters to optimize cerebral perfusion and minimize risk of hemorrhage   Monitor temperature, glucose, and sodium.  Initiate appropriate interventions as ordered     Problem: Neurosensory - Adult  Goal: Achieves stable or improved neurological status  Outcome: Progressing  Flowsheets (Taken 6/20/2022 2000)  Achieves stable or improved neurological status:   Assess for and report changes in neurological status   Initiate measures to prevent increased intracranial pressure   Maintain blood pressure and fluid volume within ordered parameters to optimize cerebral perfusion and minimize risk of hemorrhage   Monitor temperature, glucose, and sodium. Initiate appropriate interventions as ordered     Problem: Respiratory - Adult  Goal: Achieves optimal ventilation and oxygenation  Outcome: Progressing  Flowsheets (Taken 6/20/2022 2000)  Achieves optimal ventilation and oxygenation:   Assess for changes in respiratory status   Assess for changes in mentation and behavior   Position to facilitate oxygenation and minimize respiratory effort   Oxygen supplementation based on oxygen saturation or arterial blood gases   Initiate smoking cessation protocol as indicated   Encourage broncho-pulmonary hygiene including cough, deep breathe, incentive spirometry   Assess the need for suctioning and aspirate as needed   Assess and instruct to report shortness of breath or any respiratory difficulty     Problem: Cardiovascular - Adult  Goal: Maintains optimal cardiac output and hemodynamic stability  Outcome: Progressing  Flowsheets (Taken 6/20/2022 2000)  Maintains optimal cardiac output and hemodynamic stability:   Monitor blood pressure and heart rate   Monitor urine output and notify Licensed Independent Practitioner for values outside of normal range   Assess for signs of decreased cardiac output   Administer fluid and/or volume expanders as ordered   Administer vasoactive medications as ordered   For PPHN infants, administer sedation as ordered and minimize all controllable stressors.   Goal: Absence of cardiac dysrhythmias or at baseline  Outcome: Progressing  Flowsheets (Taken 6/20/2022 2000)  Absence of cardiac dysrhythmias or at baseline:   Monitor cardiac rate and rhythm   Assess for signs of decreased cardiac output   Administer antiarrhythmia medication and electrolyte replacement as ordered Problem: Skin/Tissue Integrity - Adult  Goal: Skin integrity remains intact  Outcome: Progressing  Flowsheets  Taken 6/21/2022 0204  Skin Integrity Remains Intact:   Monitor for areas of redness and/or skin breakdown   Assess vascular access sites hourly  Taken 6/20/2022 2000  Skin Integrity Remains Intact:   Monitor for areas of redness and/or skin breakdown   Assess vascular access sites hourly   Every 4-6 hours minimum: Change oxygen saturation probe site   Every 4-6 hours: If on nasal continuous positive airway pressure, respiratory therapy assesses nares and determine need for appliance change or resting period  Goal: Incisions, wounds, or drain sites healing without S/S of infection  Outcome: Progressing  Flowsheets  Taken 6/21/2022 0204  Incisions, Wounds, or Drain Sites Healing Without Sign and Symptoms of Infection:   TWICE DAILY: Assess and document skin integrity   TWICE DAILY: Assess and document dressing/incision, wound bed, drain sites and surrounding tissue  Taken 6/20/2022 2000  Incisions, Wounds, or Drain Sites Healing Without Sign and Symptoms of Infection: TWICE DAILY: Assess and document skin integrity  Goal: Oral mucous membranes remain intact  Outcome: Progressing  Flowsheets  Taken 6/21/2022 0204  Oral Mucous Membranes Remain Intact:   Assess oral mucosa and hygiene practices   Implement preventative oral hygiene regimen   Implement oral medicated treatments as ordered  Taken 6/20/2022 2000  Oral Mucous Membranes Remain Intact:   Assess oral mucosa and hygiene practices   Implement preventative oral hygiene regimen   Implement oral medicated treatments as ordered     Problem: Gastrointestinal - Adult  Goal: Minimal or absence of nausea and vomiting  Outcome: Progressing  Flowsheets (Taken 6/20/2022 2000)  Minimal or absence of nausea and vomiting:   Administer IV fluids as ordered to ensure adequate hydration   Maintain NPO status until nausea and vomiting are resolved   Nasogastric tube to low intermittent suction as ordered   Administer ordered antiemetic medications as needed   Provide nonpharmacologic comfort measures as appropriate   Advance diet as tolerated, if ordered   Nutrition consult to assist patient with adequate nutrition and appropriate food choices  Goal: Maintains or returns to baseline bowel function  Outcome: Progressing  Flowsheets (Taken 6/20/2022 2000)  Maintains or returns to baseline bowel function:   Assess bowel function   Encourage oral fluids to ensure adequate hydration   Administer IV fluids as ordered to ensure adequate hydration   Administer ordered medications as needed   Encourage mobilization and activity   Nutrition consult to assist patient with appropriate food choices  Goal: Maintains adequate nutritional intake  Outcome: Progressing  Flowsheets (Taken 6/20/2022 2000)  Maintains adequate nutritional intake:   Monitor percentage of each meal consumed   Identify factors contributing to decreased intake, treat as appropriate   Assist with meals as needed   Monitor intake and output, weight and lab values   Obtain nutritional consult as needed     Problem: Genitourinary - Adult  Goal: Absence of urinary retention  Outcome: Progressing  Flowsheets (Taken 6/20/2022 2000)  Absence of urinary retention:   Assess patients ability to void and empty bladder   Monitor intake/output and perform bladder scan as needed   Place urinary catheter per Licensed Independent Practitioner order if needed   Discuss with Licensed Independent Practitioner  medications to alleviate retention as needed   Discuss catheterization for long term situations as appropriate     Problem: Infection - Adult  Goal: Absence of infection at discharge  Outcome: Progressing  Flowsheets (Taken 6/20/2022 2000)  Absence of infection at discharge: Assess and monitor for signs and symptoms of infection  Goal: Absence of infection during hospitalization  Outcome: Progressing  Flowsheets (Taken 6/20/2022 2000)  Absence of infection during hospitalization: Assess and monitor for signs and symptoms of infection  Goal: Absence of fever/infection during anticipated neutropenic period  Outcome: Progressing  Flowsheets (Taken 6/20/2022 2000)  Absence of fever/infection during anticipated neutropenic period: Monitor white blood cell count     Problem: Metabolic/Fluid and Electrolytes - Adult  Goal: Electrolytes maintained within normal limits  Outcome: Progressing  Flowsheets (Taken 6/20/2022 2000)  Electrolytes maintained within normal limits: Monitor labs and assess patient for signs and symptoms of electrolyte imbalances  Goal: Hemodynamic stability and optimal renal function maintained  Outcome: Progressing  Flowsheets (Taken 6/20/2022 2000)  Hemodynamic stability and optimal renal function maintained:   Monitor labs and assess for signs and symptoms of volume excess or deficit   Monitor intake, output and patient weight  Goal: Glucose maintained within prescribed range  Outcome: Progressing  Flowsheets (Taken 6/20/2022 2000)  Glucose maintained within prescribed range: Monitor blood glucose as ordered     Problem: Hematologic - Adult  Goal: Maintains hematologic stability  Outcome: Progressing  Flowsheets (Taken 6/20/2022 2000)  Maintains hematologic stability: Assess for signs and symptoms of bleeding or hemorrhage     Problem: ABCDS Injury Assessment  Goal: Absence of physical injury  Outcome: Progressing  Flowsheets (Taken 6/21/2022 0204)  Absence of Physical Injury: Implement safety measures based on patient assessment

## 2022-06-21 NOTE — PROGRESS NOTES
[facility]   Phone: [phone]    Occupational Therapy    [] Initial Evaluation            [x] Daily Treatment Note         [] Discharge Summary      Patient: Wong Hills   : 1960   MRN: [id number]   Date of Service:  2022    Admitting Diagnosis:  Toe infection  Current Admission Summary: The patient is a 59 y. o. male with history of CAD, type 2 diabetes, emphysema, GERD, hyperlipidemia, hypertension, peripheral vascular disease who had a recent left AKA.  He comes from home following recurrent falls and complaining of right chest discomfort.  He fell multiple times.  Patient was recently discharged from the hospital and recommended for rehab however he declined. Gregoria Osei wishes to go to rehab at discharge.  No significant cough or production, no fevers or chills. Past Medical History:  has a past medical history of CAD (coronary artery disease), Diabetes mellitus ([address] Empyema ([address] GERD (gastroesophageal reflux disease), Hyperlipidemia, Hypertension, Low back pain, MI (myocardial infarction) ([address] PVD (peripheral vascular disease) ([address] Sleep apnea, and TIA (transient ischemic attack). Past Surgical History:  has a past surgical history that includes Coronary artery bypass graft; Toe amputation (Right); Back Injection; other surgical history; sigmoidoscopy (N/A, 2021); and Upper gastrointestinal endoscopy (N/A, 2021). Discharge Recommendations: Wong Hills scored a 12/24 on the AM-PAC ADL Inpatient form. Current research shows that an AM-PAC score of 17 or less is typically not associated with a discharge to the patient's home setting. Based on the patient's AM-PAC score and their current ADL deficits, it is recommended that the patient have 3-5 sessions per week of Occupational Therapy at d/c to increase the patient's independence. Please see assessment section for further patient specific details.     If patient discharges decreased accuracy    ROM:   (L) Shoulder: ~80 degrees     (R) Shoulder: WFL  (B) Elbow AROM WFL  (B) Wrist AROM WFL  (L) Hand: WFL     (R) Hand: WFL  Strength:   (B) UE strength grossly WFL    Decision Making: medium complexity  Clinical Presentation: evolving      Subjective  General: Pt supine in bed sleeping in semi-reclining in bed on OT arrival. Pt able to be aroused and agreeable to OT/PT cotx with encouragement. Pt reports 10/10 pain in R ribs and 8/10 pain in butt. .   Pain: 10/10. Location: R ribs, 8/10 buttocks  Pain Interventions: RN notified of patient request for pain medication        Activities of Daily Living  Basic Activities of Daily Living  Upper Extremity Bathing: moderate assistance . Comment: Required encouragement to participate; pt asked OT to wash him; pt was encouraged to wash his chest and abdomen. .  Equipment: none  Lower Extremity Bathing: dependent . Comment: tony hygiene. Equipment: none  Upper Extremity Dressing: maximum assistance . Comment: gown. Equipment: none  Lower Extremity Dressing: dependent. Equipment: none  Dressing Comments: don/doff brief, don R sock  Toileting: dependent. Equipment: none  Toileting Comments: in supine. Pt saturated in urine    Functional Mobility  Bed Mobility  Supine to Sit: dependent assistance  Rolling Left: contact guard assistance  Rolling Right: maximum assistance  Comments: Pt reports pain in R ribs, does not follow commands for hand placement and performing bed mobility. Reluctant to roll to R side d/t rib pain. Comments: After supine to sit, noted that pt's Purewick wasn't well attached and was leaking. Pt assisted with lying down to remove Purewik and perform tony hygiene and brief change. Transfers  Slide board transfer: 2 person assistance with D of 2   Comments:Pt refusing to perform transfer, required D of 2. EOB to drop-arm recliner on sliding board. Functional Mobility:  Sitting Balance: stand by assistance.     Sitting Balance Comment: on EOB      Functional Outcomes  AM-PAC Inpatient Daily Activity Raw Score: 12    Cognition  Overall Cognitive Status: Impaired  Arousal/Alterness: inconsistent responses to stimuli  Following Commands: follows one step commands with repetition, follows one step commands with increased time, inconsistently follows commands  Attention Span: difficulty attending to directions  Memory: decreased recall of biographical information  Safety Judgement: decreased awareness of need for safety  Problem Solving: assistance required to generate solutions, assistance required to implement solutions, decreased awareness of errors, assistance required to identify errors made, assistance required to correct errors made  Insights: decreased awareness of deficits  Initiation: requires cues for all  Sequencing: requires cues for all   Comment: Pt labile in emotional reactions to therapy eval, from mildly agitated to joking to making a number of inappropriate comments and jokes. At beginning of session, pt stated that he was in so much pain, \"Just hit me in the head with a bat. Hurt me. \" pt also stated that the therapists hurt him yesterday. Therapists acknowledged pt's pain in ribs, and emphasised benefits of getting OOB and working with therapy. When requested to perform and activity, pt countered with, \"Why\" or \"Do I have to? What do I get? \" or \"You do it. \" Pt requested therapists give him heroin. When therapists did not react to off-color comments/jokes, pt asked if the therapists heard him or understood what he was saying. Pt complained about the warm water he was given, and said if the therapists brought him warm water, he would throw it across the room. He also stated that if the therapists left him in the chair, he would throw a fit. Pt tangential in conversation, seeming to test reactions of therapists to his comments.   Orientation:    alert and oriented x 4  Command Following:   impaired     Education  Barriers To Learning: cognition, hearing, emotional and physical  Patient Education: patient educated on OT role and benefits, plan of care, transfer training, discharge recommendations  Learning Assessment:  patient will require reinforcement due to cognitive deficits, patient resistive towards education topics within session, would benefit from continued education    Assessment  Activity Tolerance: Limited by pain, agitation, cognition  Impairments Requiring Therapeutic Intervention: decreased functional mobility, decreased ADL status, decreased cognition, decreased endurance, increased pain  Prognosis: fair  Clinical Assessment: Pt is below his baseline level of occupational function, based on the above deficits associated with toe infection. Pt is very limited by pain and self-limiting and inappropriate behavior in response to therapy activities. He needs increased support for mobility and ADLs. Pt would benefit from continued skilled acute OT services to address these deficits and from further skilled OT services after discharge to maximize his safety and independence. Safety Interventions: patient left in chair, chair alarm in place, call light within reach, gait belt, patient at risk for falls and nurse notified    Plan  Frequency: 3-5 x/per week  Current Treatment Recommendations: functional mobility training, transfer training, endurance training, ADL/self-care training and safety education    Goals  Patient Goals: Not stated   Short Term Goals:  Time Frame: Discharge  Patient will complete upper body ADL at stand by assistance--Mod A UB bathing, Max A gown 6/21; ongoing  Patient will complete lower body ADL at moderate assistance--D LB tony hygiene, don/doff depends in supine 6/21; ongoing   Patient will complete toileting at maximum assistance--D 6/21; ongoing   Patient will complete grooming at supervision --Not addressed 6/21; ongoing  Patient will complete functional transfers at stand by assistance, .   Comment using sliding board --D of 2 6/21; ongoing    Therapy Session Time     Individual Group Co-treatment   Time In    0953   Time Out    1034   Minutes    41        Timed Code Treatment Minutes:    41 min  Total Treatment Minutes:  41 min       Electronically Signed By: KEVIN Sahu, KEVINR/GREG, [id number]

## 2022-06-21 NOTE — PLAN OF CARE
Problem: Infection - Adult  Goal: Absence of infection during hospitalization  6/21/2022 1412 by Mike Malone RN  Outcome: Progressing  6/21/2022 1412 by Mike Malone RN  Outcome: Progressing  6/21/2022 1411 by Mike Malone RN  Outcome: Progressing  6/21/2022 0207 by Ana Fajardo RN  Outcome: Progressing  Flowsheets (Taken 6/20/2022 2000)  Absence of infection during hospitalization: Assess and monitor for signs and symptoms of infection     Problem: Pain  Goal: Verbalizes/displays adequate comfort level or baseline comfort level  6/21/2022 1412 by Mike Malone RN  Outcome: Progressing  6/21/2022 1412 by Mike Malone RN  Outcome: Progressing  6/21/2022 1411 by Mike Malone RN  Outcome: Progressing  6/21/2022 0207 by Ana Fajardo RN  Outcome: Progressing  Flowsheets  Taken 6/20/2022 2330  Verbalizes/displays adequate comfort level or baseline comfort level:   Encourage patient to monitor pain and request assistance   Assess pain using appropriate pain scale  Taken 6/20/2022 2015  Verbalizes/displays adequate comfort level or baseline comfort level:   Encourage patient to monitor pain and request assistance   Assess pain using appropriate pain scale     Problem: Safety - Adult  Goal: Free from fall injury  6/21/2022 1412 by Mike Maloen RN  Outcome: Progressing  6/21/2022 1412 by Mike Malone RN  Outcome: Progressing  6/21/2022 1411 by Mike Malone RN  Outcome: Progressing  Flowsheets (Taken 6/21/2022 1407)  Free From Fall Injury: Based on caregiver fall risk screen, instruct family/caregiver to ask for assistance with transferring infant if caregiver noted to have fall risk factors  6/21/2022 0207 by Ana Fajardo RN  Outcome: Progressing  Flowsheets (Taken 6/21/2022 0204)  Free From Fall Injury:   Instruct family/caregiver on patient safety   Based on caregiver fall risk screen, instruct family/caregiver to ask for assistance with transferring infant if caregiver noted to have fall risk factors     Problem: Skin/Tissue Integrity  Goal: Absence of new skin breakdown  Description: 1. Monitor for areas of redness and/or skin breakdown  2. Assess vascular access sites hourly  3. Every 4-6 hours minimum:  Change oxygen saturation probe site  4. Every 4-6 hours:  If on nasal continuous positive airway pressure, respiratory therapy assess nares and determine need for appliance change or resting period.   6/21/2022 1412 by Arline Aldridge RN  Outcome: Progressing  6/21/2022 1412 by Arline Aldridge RN  Outcome: Progressing  6/21/2022 1411 by Arline Aldridge RN  Outcome: Progressing  6/21/2022 0207 by William Jones RN  Outcome: Progressing     Problem: Chronic Conditions and Co-morbidities  Goal: Patient's chronic conditions and co-morbidity symptoms are monitored and maintained or improved  6/21/2022 1412 by Arline Aldridge RN  Outcome: Progressing  6/21/2022 1412 by Arline Aldridge RN  Outcome: Progressing  6/21/2022 1411 by Arline Aldridge RN  Outcome: Progressing  6/21/2022 0207 by William Jones RN  Outcome: Progressing  Flowsheets (Taken 6/20/2022 2000)  Care Plan - Patient's Chronic Conditions and Co-Morbidity Symptoms are Monitored and Maintained or Improved:   Monitor and assess patient's chronic conditions and comorbid symptoms for stability, deterioration, or improvement   Collaborate with multidisciplinary team to address chronic and comorbid conditions and prevent exacerbation or deterioration   Update acute care plan with appropriate goals if chronic or comorbid symptoms are exacerbated and prevent overall improvement and discharge     Problem: Neurosensory - Adult  Goal: Achieves stable or improved neurological status  6/21/2022 1412 by Arline Aldridge RN  Outcome: Progressing  6/21/2022 1412 by Arline Aldridge RN  Outcome: Progressing  6/21/2022 1411 by Arline Aldridge RN  Outcome: Progressing  Flowsheets (Taken 6/21/2022 0400 by William Jones RN)  Achieves stable or improved neurological status: Assess for and report changes in neurological status  6/21/2022 0207 by Ephraim Atkins RN  Outcome: Progressing  Flowsheets  Taken 6/21/2022 0000  Achieves stable or improved neurological status: Assess for and report changes in neurological status  Taken 6/20/2022 2000  Achieves stable or improved neurological status:   Assess for and report changes in neurological status   Initiate measures to prevent increased intracranial pressure   Maintain blood pressure and fluid volume within ordered parameters to optimize cerebral perfusion and minimize risk of hemorrhage   Monitor temperature, glucose, and sodium.  Initiate appropriate interventions as ordered     Problem: Respiratory - Adult  Goal: Achieves optimal ventilation and oxygenation  6/21/2022 1412 by Karel Jones RN  Outcome: Progressing  6/21/2022 1412 by Karel Jones RN  Outcome: Progressing  6/21/2022 1411 by Karel Jones RN  Outcome: Progressing  6/21/2022 0207 by Ephraim Atkins RN  Outcome: Progressing  Flowsheets (Taken 6/20/2022 2000)  Achieves optimal ventilation and oxygenation:   Assess for changes in respiratory status   Assess for changes in mentation and behavior   Position to facilitate oxygenation and minimize respiratory effort   Oxygen supplementation based on oxygen saturation or arterial blood gases   Initiate smoking cessation protocol as indicated   Encourage broncho-pulmonary hygiene including cough, deep breathe, incentive spirometry   Assess the need for suctioning and aspirate as needed   Assess and instruct to report shortness of breath or any respiratory difficulty     Problem: Cardiovascular - Adult  Goal: Maintains optimal cardiac output and hemodynamic stability  6/21/2022 1412 by Karel Jones RN  Outcome: Progressing  6/21/2022 1412 by Karel Jones RN  Outcome: Progressing  6/21/2022 1411 by Karel Jones RN  Outcome: Progressing  6/21/2022 0207 by Dimple Simons RN  Outcome: Progressing  Flowsheets (Taken 6/20/2022 2000)  Maintains optimal cardiac output and hemodynamic stability:   Monitor blood pressure and heart rate   Monitor urine output and notify Licensed Independent Practitioner for values outside of normal range   Assess for signs of decreased cardiac output   Administer fluid and/or volume expanders as ordered   Administer vasoactive medications as ordered   For PPHN infants, administer sedation as ordered and minimize all controllable stressors.   Goal: Absence of cardiac dysrhythmias or at baseline  6/21/2022 1412 by Justin Gregg RN  Outcome: Progressing  6/21/2022 1412 by Justin Gregg RN  Outcome: Progressing  6/21/2022 1411 by Justin Gregg RN  Outcome: Progressing  6/21/2022 0207 by Dimple Simons RN  Outcome: Progressing  Flowsheets (Taken 6/20/2022 2000)  Absence of cardiac dysrhythmias or at baseline:   Monitor cardiac rate and rhythm   Assess for signs of decreased cardiac output   Administer antiarrhythmia medication and electrolyte replacement as ordered     Problem: Skin/Tissue Integrity - Adult  Goal: Skin integrity remains intact  6/21/2022 1412 by Justin Gregg RN  Outcome: Progressing  6/21/2022 1412 by Justin Gregg RN  Outcome: Progressing  6/21/2022 1411 by Justin Gregg RN  Outcome: Progressing  Flowsheets (Taken 6/21/2022 1409)  Skin Integrity Remains Intact: Monitor for areas of redness and/or skin breakdown  6/21/2022 0207 by Dimple Simons RN  Outcome: Progressing  Flowsheets  Taken 6/21/2022 0204  Skin Integrity Remains Intact:   Monitor for areas of redness and/or skin breakdown   Assess vascular access sites hourly  Taken 6/20/2022 2000  Skin Integrity Remains Intact:   Monitor for areas of redness and/or skin breakdown   Assess vascular access sites hourly   Every 4-6 hours minimum: Change oxygen saturation probe site   Every 4-6 hours: If on nasal continuous positive airway pressure, respiratory therapy assesses nares and determine need for appliance change or resting period  Goal: Incisions, wounds, or drain sites healing without S/S of infection  6/21/2022 1412 by Velma Rosa RN  Outcome: Progressing  6/21/2022 1412 by Velma Rosa RN  Outcome: Progressing  6/21/2022 1411 by Velma Rosa RN  Outcome: Progressing  Flowsheets (Taken 6/21/2022 1409)  Incisions, Wounds, or Drain Sites Healing Without Sign and Symptoms of Infection: TWICE DAILY: Assess and document skin integrity  6/21/2022 0207 by Henna Osorio RN  Outcome: Progressing  Flowsheets  Taken 6/21/2022 0204  Incisions, Wounds, or Drain Sites Healing Without Sign and Symptoms of Infection:   TWICE DAILY: Assess and document skin integrity   TWICE DAILY: Assess and document dressing/incision, wound bed, drain sites and surrounding tissue  Taken 6/20/2022 2000  Incisions, Wounds, or Drain Sites Healing Without Sign and Symptoms of Infection: TWICE DAILY: Assess and document skin integrity  Goal: Oral mucous membranes remain intact  6/21/2022 1412 by Velma Rosa RN  Outcome: Progressing  6/21/2022 1412 by Velma Rosa RN  Outcome: Progressing  6/21/2022 1411 by Velma Rosa RN  Outcome: Progressing  Flowsheets (Taken 6/21/2022 1409)  Oral Mucous Membranes Remain Intact: Assess oral mucosa and hygiene practices  6/21/2022 0207 by Henna Osorio RN  Outcome: Progressing  Flowsheets  Taken 6/21/2022 0204  Oral Mucous Membranes Remain Intact:   Assess oral mucosa and hygiene practices   Implement preventative oral hygiene regimen   Implement oral medicated treatments as ordered  Taken 6/20/2022 2000  Oral Mucous Membranes Remain Intact:   Assess oral mucosa and hygiene practices   Implement preventative oral hygiene regimen   Implement oral medicated treatments as ordered     Problem: Musculoskeletal - Adult  Goal: Return mobility to safest level of function  6/21/2022 1412 by Velma Rosa RN  Outcome: Progressing  6/21/2022 1412 by Francisco Beltran RN  Outcome: Progressing  6/21/2022 1411 by Francisco Beltran RN  Outcome: Progressing  6/21/2022 0207 by Marbin Conteh RN  Outcome: Progressing  Goal: Maintain proper alignment of affected body part  6/21/2022 1412 by Francisco Beltran RN  Outcome: Progressing  6/21/2022 1412 by Francisco Beltran RN  Outcome: Progressing  6/21/2022 1411 by Francisco Beltran RN  Outcome: Progressing  6/21/2022 0207 by Marbin Conteh RN  Outcome: Progressing  Goal: Return ADL status to a safe level of function  6/21/2022 1412 by Francisco Beltran RN  Outcome: Progressing  6/21/2022 1412 by Francisco Beltran RN  Outcome: Progressing  6/21/2022 1411 by Francisco Beltran RN  Outcome: Progressing  6/21/2022 0207 by Marbin Conteh RN  Outcome: Progressing     Problem: Gastrointestinal - Adult  Goal: Minimal or absence of nausea and vomiting  6/21/2022 1412 by Francisco Beltran RN  Outcome: Progressing  6/21/2022 1412 by Francisco Beltran RN  Outcome: Progressing  6/21/2022 1411 by Francisco Beltran RN  Outcome: Progressing  6/21/2022 0207 by Marbin Conteh RN  Outcome: Progressing  Flowsheets (Taken 6/20/2022 2000)  Minimal or absence of nausea and vomiting:   Administer IV fluids as ordered to ensure adequate hydration   Maintain NPO status until nausea and vomiting are resolved   Nasogastric tube to low intermittent suction as ordered   Administer ordered antiemetic medications as needed   Provide nonpharmacologic comfort measures as appropriate   Advance diet as tolerated, if ordered   Nutrition consult to assist patient with adequate nutrition and appropriate food choices  Goal: Maintains or returns to baseline bowel function  6/21/2022 1412 by Francisco Beltran RN  Outcome: Progressing  6/21/2022 1412 by Francisco Beltran RN  Outcome: Progressing  6/21/2022 1411 by Francisco Beltran RN  Outcome: Progressing  6/21/2022 0207 by Marbin Conteh RN  Outcome: Progressing  Flowsheets (Taken 6/20/2022 2000)  Maintains or returns to baseline bowel function:   Assess bowel function   Encourage oral fluids to ensure adequate hydration   Administer IV fluids as ordered to ensure adequate hydration   Administer ordered medications as needed   Encourage mobilization and activity   Nutrition consult to assist patient with appropriate food choices  Goal: Maintains adequate nutritional intake  6/21/2022 1412 by Justice Cardoza RN  Outcome: Progressing  6/21/2022 1412 by Justice Cardoza RN  Outcome: Progressing  6/21/2022 1411 by Justice Cardoza RN  Outcome: Progressing  6/21/2022 0207 by Juan Alberto Aguilar RN  Outcome: Progressing  Flowsheets (Taken 6/20/2022 2000)  Maintains adequate nutritional intake:   Monitor percentage of each meal consumed   Identify factors contributing to decreased intake, treat as appropriate   Assist with meals as needed   Monitor intake and output, weight and lab values   Obtain nutritional consult as needed     Problem: Genitourinary - Adult  Goal: Absence of urinary retention  6/21/2022 1412 by Justice Cardoza RN  Outcome: Progressing  6/21/2022 1412 by Justice Cardoza RN  Outcome: Progressing  6/21/2022 1411 by Justice Cardoza RN  Outcome: Progressing  6/21/2022 0207 by Juan Alberto Aguilar RN  Outcome: Progressing  Flowsheets (Taken 6/20/2022 2000)  Absence of urinary retention:   Assess patients ability to void and empty bladder   Monitor intake/output and perform bladder scan as needed   Place urinary catheter per Licensed Independent Practitioner order if needed   Discuss with Licensed Independent Practitioner  medications to alleviate retention as needed   Discuss catheterization for long term situations as appropriate     Problem: Infection - Adult  Goal: Absence of infection at discharge  6/21/2022 1412 by Justice Cardoza RN  Outcome: Progressing  6/21/2022 1412 by Justice Cardoza RN  Outcome: Progressing  6/21/2022 1411 by Justice aCrdoza RN  Outcome: Progressing  6/21/2022 0207 by Maida Moulton RN  Outcome: Progressing  Flowsheets (Taken 6/20/2022 2000)  Absence of infection at discharge: Assess and monitor for signs and symptoms of infection  Goal: Absence of infection during hospitalization  6/21/2022 1412 by Tuan Schmitt RN  Outcome: Progressing  6/21/2022 1412 by Tuan Schmitt RN  Outcome: Progressing  6/21/2022 1411 by Tuan Schmitt RN  Outcome: Progressing  6/21/2022 0207 by Maida Moulton RN  Outcome: Progressing  Flowsheets (Taken 6/20/2022 2000)  Absence of infection during hospitalization: Assess and monitor for signs and symptoms of infection  Goal: Absence of fever/infection during anticipated neutropenic period  6/21/2022 1412 by Tuan Schmitt RN  Outcome: Progressing  6/21/2022 1412 by Tuan Schmitt RN  Outcome: Progressing  6/21/2022 1411 by Tuan Schmitt RN  Outcome: Progressing  6/21/2022 0207 by Maida Moulton RN  Outcome: Progressing  Flowsheets (Taken 6/20/2022 2000)  Absence of fever/infection during anticipated neutropenic period: Monitor white blood cell count     Problem: Infection - Adult  Goal: Absence of infection at discharge  6/21/2022 1412 by Tuan Schmitt RN  Outcome: Progressing  6/21/2022 1412 by Tuan Schmitt RN  Outcome: Progressing  6/21/2022 1411 by Tuan Schmitt RN  Outcome: Progressing  6/21/2022 0207 by Maida Moulton RN  Outcome: Progressing  Flowsheets (Taken 6/20/2022 2000)  Absence of infection at discharge: Assess and monitor for signs and symptoms of infection  Goal: Absence of infection during hospitalization  6/21/2022 1412 by Tuan Schmitt RN  Outcome: Progressing  6/21/2022 1412 by Tuan Schimtt RN  Outcome: Progressing  6/21/2022 1411 by Tuan Schmitt RN  Outcome: Progressing  6/21/2022 0207 by Maida Moulton RN  Outcome: Progressing  Flowsheets (Taken 6/20/2022 2000)  Absence of infection during hospitalization: Assess and monitor for signs and symptoms of infection  Goal: Absence of fever/infection during anticipated neutropenic period  6/21/2022 1412 by Kaela Peralta RN  Outcome: Progressing  6/21/2022 1412 by Kaela Peralta RN  Outcome: Progressing  6/21/2022 1411 by Kaela Peralta RN  Outcome: Progressing  6/21/2022 0207 by Debby Otto RN  Outcome: Progressing  Flowsheets (Taken 6/20/2022 2000)  Absence of fever/infection during anticipated neutropenic period: Monitor white blood cell count     Problem: Metabolic/Fluid and Electrolytes - Adult  Goal: Electrolytes maintained within normal limits  6/21/2022 1412 by Kaela Peralta RN  Outcome: Progressing  6/21/2022 1412 by Kaela Peralta RN  Outcome: Progressing  6/21/2022 1411 by Kaela Peralta RN  Outcome: Progressing  6/21/2022 0207 by Debby Otto RN  Outcome: Progressing  Flowsheets (Taken 6/20/2022 2000)  Electrolytes maintained within normal limits: Monitor labs and assess patient for signs and symptoms of electrolyte imbalances  Goal: Hemodynamic stability and optimal renal function maintained  6/21/2022 1412 by Kaela Peralta RN  Outcome: Progressing  6/21/2022 1412 by Kaela Peralta RN  Outcome: Progressing  6/21/2022 1411 by Kaela Peralta RN  Outcome: Progressing  6/21/2022 0207 by Debby Otto RN  Outcome: Progressing  Flowsheets (Taken 6/20/2022 2000)  Hemodynamic stability and optimal renal function maintained:   Monitor labs and assess for signs and symptoms of volume excess or deficit   Monitor intake, output and patient weight  Goal: Glucose maintained within prescribed range  6/21/2022 1412 by Kaela Peralta RN  Outcome: Progressing  6/21/2022 1412 by Kaela Peralta RN  Outcome: Progressing  6/21/2022 1411 by Kaela Peralta RN  Outcome: Progressing  6/21/2022 0207 by Debby Otto RN  Outcome: Progressing  Flowsheets (Taken 6/20/2022 2000)  Glucose maintained within prescribed range: Monitor blood glucose as ordered     Problem: Hematologic - Adult  Goal: Maintains hematologic stability  6/21/2022 1412 by Osiel Gregorio RN  Outcome: Progressing  6/21/2022 1412 by Osiel Gregorio RN  Outcome: Progressing  6/21/2022 1411 by Osiel Gregorio RN  Outcome: Progressing  6/21/2022 0207 by Claudine Gold RN  Outcome: Progressing  Flowsheets (Taken 6/20/2022 2000)  Maintains hematologic stability: Assess for signs and symptoms of bleeding or hemorrhage

## 2022-06-21 NOTE — CONSULTS
Infectious Diseases   Consult Note        Admission Date: 6/18/2022  Hospital Day: Hospital Day: 4   Attending: Xochitl Espinoza MD  Date of service: 6/21/22     Reason for admission: Toe infection [L08.9]    Chief complaint/ Reason for consult: UTI, right diabetic foot infection    Microbiology:        I have reviewed allavailable micro lab data and cultures    · Urine culture  - collected on 6/18/2022 50,000 CFU per mL of MRSA      Antibiotics and immunizations:       Current antibiotics: All antibiotics and their doses were reviewed by me    Recent Abx Admin                   piperacillin-tazobactam (ZOSYN) 3,375 mg in dextrose 5 % 50 mL IVPB extended infusion (mini-bag) (mg) 3,375 mg New Bag 06/21/22 1154     3,375 mg New Bag  0501     3,375 mg New Bag 06/20/22 2209                  Immunization History: All immunization history was reviewed by me today. Immunization History   Administered Date(s) Administered    COVID-19, Tiny Tyler, Primary or Immunocompromised, PF, 100mcg/0.5mL 04/13/2021, 05/11/2021       Known drug allergies: All allergies were reviewed and updated    Allergies   Allergen Reactions    Amiodarone     Bactrim [Sulfamethoxazole-Trimethoprim] Hives       Social history:     Social History:  All social andepidemiologic history was reviewed and updated by me today as needed. · Tobacco use:   reports that he has been smoking. He has a 80.00 pack-year smoking history. He has never used smokeless tobacco.  · Alcohol use:   reports previous alcohol use. · Currently lives in: [place] [id number]  ·  reports no history of drug use.      COVID VACCINATION AND LAB RESULT RECORDS:     Internal Administration   First Dose COVID-19, Moderna, Primary or Immunocompromised, PF, 100mcg/0.5mL  04/13/2021   Second Dose COVID-19, Tiny Barrs, Primary or Immunocompromised, PF, 100mcg/0.5mL   05/11/2021       Last COVID Lab SARS-CoV-2, NAAT (no units)   Date Value   04/19/2021 Not Detected Assessment:     The patient is a 63 y.o. old male who  has a past medical history of CAD (coronary artery disease), Diabetes mellitus ([address] Empyema ([address] GERD (gastroesophageal reflux disease), Hyperlipidemia, Hypertension, Low back pain, MI (myocardial infarction) ([facility] [facility] PVD (peripheral vascular disease) ([facility] [address] Sleep apnea, and TIA (transient ischemic attack). with following problems:    · Right diabetic foot infection  · Concern for urinary tract infection  · Longstanding type 2 diabetes mellitus-poorly controlled  · Chronic indwelling Naylor catheter  · History of sacral decubitus ulcer  · Diabetic polyneuropathy type II  · Essential hypertension  · Coronary artery disease s/p CABG  · History of falls at home resulting in multiple rib fractures  · Hyperlipidemia  · Coronary artery disease  · Gastroesophageal reflux disease  · History of cholecystectomy. ·  History of left above-knee amputation      Discussion:      The patient had a CT scan of chest abdomen and pelvis without contrast done on admission which did not show any signs of infection. There was some concern for possible cystitis. Urinalysis showed large leukocyte esterase and 4+ bacteria. Blood cultures from admission have been negative. White cell count was 12,200 on admission. He had an x-ray of the right foot done. It did not show any obvious osteomyelitis. He had history of ESBL Klebsiella bacteremia last month, which was diagnosed at [place].   He was reportedly treated with levofloxacin at that time    Urine culture is growing 50,000 CFU per mL of MRSA so far      Plan:     Diagnostic Workup:    · Follow-up on blood and urine cultures  · Will order left leg wound cultures  · Continue to follow fever curve, WBC count and blood cultures  · Follow up on liverand renal functions closely    Antimicrobials:    · Will order PO linezolid 600 mg every 12 hours for empiric lower leg wound gram positive coverage and for urine MRSA coverage  · Ok to continue iv zosyn 3.375 gm every 8 hours  · Pain control for rib fractures per primary  · We will follow up on the culture results and clinical progress and will make further recommendations accordingly. · Continue close vitals monitoring. · Maintain good glycemic control. · Fall precautions. Aspiration precautions. · Continue to watch for new fever or diarrhea. · DVT prophylaxis. · Discussed all above with patient and RN. Drug Monitoring:    · Continue serial monitoring for antibiotic toxicity as follows: CBC, CMP  · Continue to watch for following: new or worsening fever, hypotension, hives, lip swelling and redness or purulence at vascular access sites. I/v access Management:    · Continue to monitor i.v access sites for erythema, induration, discharge or tenderness. · As always, continue efforts to minimizetubes/lines/drains as clinically appropriate to reduce chances of line associated infections. Current isolation precautions:    Currently active isolation(s): Contact       Level of complexity of consult: High     Risk of Complications/Morbidity: High     · Illness(es)/ Infection present that pose threat to life/bodily function. · There is potential for severe exacerbation of infection/side effects of treatment. · Therapy requires intensive monitoring for antimicrobial agent toxicity. Thank you for involving me in the care of your patient. I will continue to follow. If you have any additional questions, please do not hesitate to contact me. Subjective:     Presenting complaint in ER:     Chief Complaint   Patient presents with    Fall     Patient in via [address] [facility] for multiple calls for falls this evening. Patient states he has lower back pain as well and side pain. HPI: Hyacinth Staley is a 63 y.o. male patient, who was seen at the request of Dr. Xochitl Espinoza MD.    History was obtained from chart review and the patient.        The patient was admitted on 6/18/2022. I have been consulted to see the patient for above mentioned reason(s). The patient has multiple medical comorbidities, and presented to the ER for multiple falls at home and with back pain and flank pain. The patient has longstanding type 2 diabetes mellitus with diabetic polyneuropathy and has a longstanding right hallux area ulcer. He was hospitalized. He had elevated white cell count of 12,000. Blood cultures were sent on admission. He was given IV cefazolin for 3 days and then was switched to IV Zosyn by primary team yesterday. Blood cultures from admission remain negative. Primary team is concerned about worsening right foot infection      I have been asked for my opinion for management for this patient. Past Medical History: All past medical history reviewed today. Past Medical History:   Diagnosis Date    CAD (coronary artery disease)     Diabetes mellitus ([facility]     Empyema ([address]     GERD (gastroesophageal reflux disease)     Hyperlipidemia     Hypertension     Low back pain     MI (myocardial infarction) ([address]     [facility]     Sleep apnea     TIA (transient ischemic attack)          Past Surgical History: All pastsurgical history was reviewed today. Past Surgical History:   Procedure Laterality Date    BACK INJECTION      CORONARY ARTERY BYPASS GRAFT      OTHER SURGICAL HISTORY      right leg vascular stents    SIGMOIDOSCOPY N/A 4/19/2021    SIGMOIDOSCOPY DIAGNOSTIC FLEXIBLE performed by Yuridia Sanchez MD at [facility] Right     UPPER GASTROINTESTINAL ENDOSCOPY N/A 4/19/2021    EGD BIOPSY performed by Yuridia Sanchez MD at [address] [place] [address]         Family History: All family history was reviewed today. History reviewed. No pertinent family history. Medications: All current and past medications were reviewed.     Medications Prior to Admission: insulin glargine (LANTUS) 100 UNIT/ML injection vial, Inject 20 Units into the skin nightly  losartan (COZAAR) 50 MG tablet, Take 50 mg by mouth in the morning and at bedtime  methocarbamol (ROBAXIN) 500 MG tablet, Take 500 mg by mouth 3 times daily as needed  omeprazole (PRILOSEC) 20 MG delayed release capsule, Take 20 mg by mouth daily  oxyCODONE-acetaminophen (PERCOCET) 5-325 MG per tablet, Take 1 tablet by mouth every 4 hours as needed for Pain. insulin lispro (HUMALOG) 100 UNIT/ML injection vial, Inject 0-9 Units into the skin 3 times daily (before meals) SSI  metoprolol succinate (TOPROL XL) 50 MG extended release tablet, Take 50 mg by mouth daily   pregabalin (LYRICA) 150 MG capsule, Take 150 mg by mouth 2 times daily. spironolactone (ALDACTONE) 25 MG tablet, Take 25 mg by mouth daily  tadalafil (CIALIS) 5 MG tablet, Take 5 mg by mouth as needed for Erectile Dysfunction  diphenhydrAMINE HCl (BENADRYL PO), Take 25 mg by mouth every 6 hours as needed   acetaminophen (TYLENOL) 500 MG tablet, Take 1,000 mg by mouth every 12 hours as needed for Pain   aspirin 81 MG EC tablet, Take 81 mg by mouth daily  atorvastatin (LIPITOR) 80 MG tablet, Take 80 mg by mouth at bedtime   clopidogrel (PLAVIX) 75 MG tablet, Take 75 mg by mouth daily  DULoxetine (CYMBALTA) 30 MG extended release capsule, Take 30 mg by mouth 2 times daily   [DISCONTINUED] Insulin Aspart Prot & Aspart (NOVOLOG MIX 70/30 FLEXPEN SC), Inject 10 Units into the skin 3 times daily Plus sliding scale  [DISCONTINUED] lisinopril-hydroCHLOROthiazide (ZESTORETIC) 20-25 MG per tablet, Take 1 tablet by mouth daily  [DISCONTINUED] nicotine (NICODERM CQ) 21 MG/24HR, Place 1 patch onto the skin every 24 hours (Patient not taking: Reported on 6/18/2022)  [DISCONTINUED] nitroGLYCERIN (NITROSTAT) 0.4 MG SL tablet, Place 0.4 mg under the tongue every 5 minutes as needed for Chest pain up to max of 3 total doses. If no relief after 1 dose, call 911.  (Patient not taking: Reported on 6/18/2022)  [DISCONTINUED] terbinafine (LAMISIL) 250 MG tablet, Take 250 mg by mouth daily  [DISCONTINUED] famotidine (PEPCID) 40 MG tablet, Take 40 mg by mouth daily  [DISCONTINUED] finasteride (PROSCAR) 5 MG tablet, Take 5 mg by mouth daily     piperacillin-tazobactam  3,375 mg IntraVENous Q8H    aspirin  81 mg Oral Daily    atorvastatin  80 mg Oral Daily    clopidogrel  75 mg Oral Daily    DULoxetine  30 mg Oral BID    metoprolol succinate  50 mg Oral Daily    pregabalin  150 mg Oral BID    spironolactone  25 mg Oral Daily    sodium chloride flush  5-40 mL IntraVENous 2 times per day    enoxaparin  40 mg SubCUTAneous Daily    losartan  50 mg Oral BID    insulin glargine  20 Units SubCUTAneous Nightly    insulin lispro  0-12 Units SubCUTAneous TID WC    insulin lispro  0-6 Units SubCUTAneous Nightly    insulin lispro  5 Units SubCUTAneous TID WC    mupirocin   Topical Daily    nicotine  1 patch TransDERmal Daily          REVIEW OF SYSTEMS:       Review of Systems   Constitutional: Positive for fatigue. Negative for chills, diaphoresis and fever. HENT: Negative for ear discharge, ear pain, rhinorrhea, sore throat and trouble swallowing. Eyes: Negative for discharge and redness. Respiratory: Negative for cough, shortness of breath and wheezing. Cardiovascular: Negative for chest pain and leg swelling. Gastrointestinal: Negative for abdominal pain, constipation, diarrhea and nausea. Endocrine: Negative for polyuria. Genitourinary: Negative for dysuria, flank pain, frequency, hematuria and urgency. Musculoskeletal: Negative for back pain and myalgias. Skin: Negative for rash. Right leg wound   Neurological: Negative for dizziness, seizures and headaches. Hematological: Does not bruise/bleed easily. Psychiatric/Behavioral: Negative for hallucinations and suicidal ideas. All other systems reviewed and are negative.          Objective:       PHYSICAL EXAM:      Vitals: Vitals:    06/20/22 2330 06/21/22 0423 06/21/22 0745 06/21/22 1046   BP: (!) 154/90 115/67 126/81 91/61   Pulse: 87 94 93 95   Resp: 16 16 16 16   Temp: 97.5 °F (36.4 °C) 98.3 °F (36.8 °C) 97.6 °F (36.4 °C)    TempSrc:  Oral Oral    SpO2: 95% 97% 98% 100%   Weight:       Height:           Physical Exam  Vitals and nursing note reviewed. Constitutional:       Appearance: Normal appearance. He is well-developed. HENT:      Head: Normocephalic and atraumatic. Right Ear: External ear normal.      Left Ear: External ear normal.      Nose: Nose normal. No congestion or rhinorrhea. Mouth/Throat:      Mouth: Mucous membranes are moist.      Pharynx: No oropharyngeal exudate or posterior oropharyngeal erythema. Eyes:      General: No scleral icterus. Right eye: No discharge. Left eye: No discharge. Conjunctiva/sclera: Conjunctivae normal.      Pupils: Pupils are equal, round, and reactive to light. Cardiovascular:      Rate and Rhythm: Normal rate and regular rhythm. Pulses: Normal pulses. Heart sounds: No murmur heard. No friction rub. Pulmonary:      Effort: Pulmonary effort is normal. No respiratory distress. Breath sounds: Normal breath sounds. No stridor. No wheezing, rhonchi or rales. Abdominal:      General: Bowel sounds are normal.      Palpations: Abdomen is soft. Tenderness: There is no abdominal tenderness. There is no right CVA tenderness, left CVA tenderness, guarding or rebound. Musculoskeletal:         General: No swelling or tenderness. Normal range of motion. Cervical back: Normal range of motion and neck supple. No rigidity. No muscular tenderness. Lymphadenopathy:      Cervical: No cervical adenopathy. Skin:     General: Skin is warm and dry. Coloration: Skin is not jaundiced. Findings: No erythema or rash.       Comments: Superficial right lower extremity wound noted above ankle     Previous right fourth and fifth toe amputation noted. Some local bruising noted     Neurological:      General: No focal deficit present. Mental Status: He is alert and oriented to person, place, and time. Mental status is at baseline. Motor: No abnormal muscle tone. Psychiatric:         Mood and Affect: Mood normal.         Behavior: Behavior normal.         Thought Content: Thought content normal.           Lines and drains: All vascular access sites are healthy with no local erythema, discharge or tenderness. Intake and output:     I/O last 3 completed shifts: In: 380 [P.O.:360; I.V.:20]  Out: 350 [Urine:350]    Lab Data:   All available labs were reviewed by me today. CBC:   Recent Labs     06/18/22 2241 06/20/22  0642   WBC 12.2* 10.3   RBC 4.93 4.38   HGB 13.4* 11.8*   HCT 41.3 36.5*   * 383   MCV 83.9 83.4   MCH 27.2 27.0   MCHC 32.4 32.4   RDW 16.8* 16.8*        BMP:  Recent Labs     06/18/22 2241 06/20/22  0642    133*   K 4.8 3.7    101   CO2 25 22   BUN 18 26*   CREATININE 0.8 0.8   CALCIUM 10.2 8.9   GLUCOSE 456* 242*        Hepatic FunctionPanel:   Lab Results   Component Value Date    ALKPHOS 264 06/18/2022    ALT 10 06/18/2022    AST 13 06/18/2022    PROT 7.8 06/18/2022    BILITOT 0.3 06/18/2022    BILIDIR <0.2 06/18/2022    IBILI see below 06/18/2022    LABALBU 3.9 06/18/2022       CPK: No results found for: CKTOTAL  ESR: No results found for: SEDRATE  CRP: No results found for: CRP      Imaging: All pertinent images and reports for the current visit were reviewed by me during this visit. I reviewed the chest x-ray/CT scan/MRI images and independently interpreted the findings and results today. XR FOOT RIGHT (2 VIEWS)   Final Result   No osteomyelitis or acute osseous injury is identified. CT CHEST ABDOMEN PELVIS WO CONTRAST   Final Result   1.  Focal rib deformity seen in the right chest adjacent to the anterior   costochondral junction related to nondisplaced rib fractures, involving the   3rd, 4th, 5th and 6th ribs. These may be related to subacute fractures,   though correlate with point tenderness. Of note, no adjacent parenchymal   abnormality in the lungs to suggest pulmonary contusion and acute injury. No   pneumothorax. 2. No other acute process seen within the chest, abdomen or pelvis. 3. Focal apical pleural-parenchymal suspected scarring at the left lung apex. 4. Atherosclerotic disease including coronary artery involvement, with   postoperative change from prior CABG. 5. Cholecystectomy. 6. Bladder wall thickening, which may be related to poor distention or   possibly cystitis. A small amount of intraluminal air may be related to   recent instrumentation. 7. Prostate enlargement. 8. Multilevel degenerative changes are seen throughout the thoracic and   lumbar spine, greatest at the level of L4-L5. CT HEAD WO CONTRAST   Final Result   No acute intracranial abnormality. Patchy white matter low attenuation with intracranial atherosclerotic disease   suggestive of chronic microvascular ischemic change. Outside records:    Labs, Microbiology, Radiology and pertinent results from Care everywhere, if available, were reviewed as a part ofthe consultation.       Problem list:       Patient Active Problem List   Diagnosis Code    Right hallux infection L08.9    Open wound of right great toe S91.101A    Frequent falls R29.6    Closed fracture of multiple ribs of right side with routine healing S22.41XD    History of left above knee amputation ([address] G06.979    Type 2 diabetes mellitus with right diabetic foot infection (HCC) E11.628, L08.9    Urinary tract infection without hematuria N39.0    Chronic indwelling Naylor catheter Z97.8    Pressure injury of skin of sacral region L89.159    Diabetic polyneuropathy associated with type 2 diabetes mellitus ([address] E11.42    Essential hypertension I10    Coronary artery disease due to lipid rich plaque I25.10, I25.83    Gastroesophageal reflux disease without esophagitis K21.9    History of cholecystectomy Z90.49         Please note that this chart was generated using Dragon dictation software. Although every effort was made to ensure the accuracy of this automated transcription, some errors in transcription may have occurred inadvertently. If you may need any clarification, please do not hesitate to contact me through EPIC or at the phone number provided below with my electronic signature. Any pictures or media included in this note were obtained after taking informed verbal consent from the patient and with their approval to include those in the patient's medical record.         Jann Fang MD, MPH, FACP, FIDSA  6/21/2022, 2:39 PM  [facility] Infectious Disease   [address] [place], [place] [address] [facility], [address] [place] [address]  Office: [phone]  Fax: [phone]  Clinic days:  Tuesday & Thursday

## 2022-06-21 NOTE — PROGRESS NOTES
Assessment complete. VSS. Patient resting in bed. Respirations even and easy. Call light in reach. Fall precautions in place. No needs expressed at this time.

## 2022-06-21 NOTE — PROGRESS NOTES
[facility]    6/21/2022 7:45 AM        Name: Macey Lancaster . Admitted: 6/18/2022  Primary Care Provider: No primary care provider on file. (Tel: None)          Subjective:    Patient was admitted with weakness, R great toe cellulitis. Wife was pushing him down their wheelchair ramp  when he went to fast and fell forward at bottom of the ramp falling on the ground on his R side. He was recently at [facility] with hyperglycemia but also has abnormal stress test. Cath was not performed-cards elected for maximizing medical therapy. He is living in a hotel with his wife. He has exhausted all his skilled days due to his recent left AKA  Not interested in transitioning to medicaid, bc then his wife would not have a place to stay.         Reviewed interval ancillary notes    Current Medications  diphenhydrAMINE (BENADRYL) tablet 25 mg, Nightly PRN  piperacillin-tazobactam (ZOSYN) 3,375 mg in dextrose 5 % 50 mL IVPB extended infusion (mini-bag), Q8H  aspirin EC tablet 81 mg, Daily  atorvastatin (LIPITOR) tablet 80 mg, Daily  clopidogrel (PLAVIX) tablet 75 mg, Daily  DULoxetine (CYMBALTA) extended release capsule 30 mg, BID  metoprolol succinate (TOPROL XL) extended release tablet 50 mg, Daily  pregabalin (LYRICA) capsule 150 mg, BID  spironolactone (ALDACTONE) tablet 25 mg, Daily  sodium chloride flush 0.9 % injection 5-40 mL, 2 times per day  sodium chloride flush 0.9 % injection 5-40 mL, PRN  0.9 % sodium chloride infusion, PRN  enoxaparin (LOVENOX) injection 40 mg, Daily  ondansetron (ZOFRAN-ODT) disintegrating tablet 4 mg, Q8H PRN   Or  ondansetron (ZOFRAN) injection 4 mg, Q6H PRN  polyethylene glycol (GLYCOLAX) packet 17 g, Daily PRN  acetaminophen (TYLENOL) tablet 650 mg, Q6H PRN   Or  acetaminophen (TYLENOL) suppository 650 mg, Q6H PRN  dextrose bolus 10% 125 mL, PRN   Or  dextrose bolus 10% 250 mL, PRN  glucagon (rDNA) injection 1 mg, PRN  dextrose 5 % solution, PRN  losartan (COZAAR) tablet 50 mg, BID  insulin glargine (LANTUS) injection vial 20 Units, Nightly  insulin lispro (HUMALOG) injection vial 0-12 Units, TID WC  insulin lispro (HUMALOG) injection vial 0-6 Units, Nightly  insulin lispro (HUMALOG) injection vial 5 Units, TID WC  HYDROcodone-acetaminophen (NORCO) 5-325 MG per tablet 1 tablet, Q6H PRN  mupirocin (BACTROBAN) 2 % ointment, Daily  nicotine (NICODERM CQ) 21 MG/24HR 1 patch, Daily        Objective:  /67   Pulse 94   Temp 98.3 °F (36.8 °C) (Oral)   Resp 16   Ht 5' 8\" (1.727 m)   Wt 165 lb 12.8 oz (75.2 kg)   SpO2 97%   BMI 25.21 kg/m²     Intake/Output Summary (Last 24 hours) at 6/21/2022 0745  Last data filed at 6/20/2022 2330  Gross per 24 hour   Intake 380 ml   Output 350 ml   Net 30 ml      Wt Readings from Last 3 Encounters:   06/19/22 165 lb 12.8 oz (75.2 kg)       General appearance:  Appears chronically ill and frail . Eyes: Sclera clear. Pupils equal.  ENT: Moist oral mucosa. Trachea midline, no adenopathy. Cardiovascular: Regular rhythm, normal S1, S2. No murmur. No edema in lower extremities  Respiratory: Not using accessory muscles. Good inspiratory effort. Clear to auscultation bilaterally, no wheeze or crackles. GI: Abdomen soft, no tenderness, not distended, normal bowel sounds  Musculoskeletal: No cyanosis in digits, neck supple  Neurology: CN 2-12 grossly intact. No speech or motor deficits  Psych: Normal affect. Alert and oriented in time, place and person  Skin: Warm, dry, poor turgor. Left stump is well healed. Right foot dressing removed. Several toes absent. Remaining toes with dried ulcerations with no current drainage.       Labs and Tests:  CBC:   Recent Labs     06/18/22 2241 06/20/22  0642   WBC 12.2* 10.3   HGB 13.4* 11.8*   * 383     BMP:    Recent Labs     06/18/22  2241 06/20/22  0642    133*   K 4.8 3.7    101 CO2 25 22   BUN 18 26*   CREATININE 0.8 0.8   GLUCOSE 456* 242*     Hepatic:   Recent Labs     06/18/22  2241   AST 13*   ALT 10   BILITOT 0.3   ALKPHOS 264*     Cardiology note from Dr Dionne Waterman at [facility].     Stress examination with small apical reperfusion defect. Risk to benefit not in favor of invasive cardiac catheterization. Will concentrate on hypertensive urgency as presenting picture and maximizing medical therapy to assist with pressure control and improvement LVEF. Losartan increased to twice daily today, will observe clinical response. Results for Lo Reyna (MRN [id number]) as of 6/21/2022 07:47   Ref. Range 6/20/2022 11:15 6/20/2022 11:35 6/20/2022 16:51 6/20/2022 20:26 6/21/2022 07:19   POC Glucose Latest Ref Range: 70 - 99 mg/dl 119 (H) 131 (H) 126 (H) 176 (H) 155 (H)     AIC 8.2 on May 28 2022    No osteomyelitis or acute osseous injury is identified.           CT head:  No acute intracranial abnormality.       Patchy white matter low attenuation with intracranial atherosclerotic disease   suggestive of chronic microvascular ischemic change.         CT abd and pelvis    . Focal rib deformity seen in the right chest adjacent to the anterior   costochondral junction related to nondisplaced rib fractures, involving the   3rd, 4th, 5th and 6th ribs.  These may be related to subacute fractures,   though correlate with point tenderness.  Of note, no adjacent parenchymal   abnormality in the lungs to suggest pulmonary contusion and acute injury.  No   pneumothorax. 2. No other acute process seen within the chest, abdomen or pelvis. 3. Focal apical pleural-parenchymal suspected scarring at the left lung apex. 4. Atherosclerotic disease including coronary artery involvement, with   postoperative change from prior CABG. 5. Cholecystectomy.    6. Bladder wall thickening, which may be related to poor distention or   possibly cystitis.  A small amount of intraluminal air may be

## 2022-06-21 NOTE — PLAN OF CARE
Problem: Pain  Goal: Verbalizes/displays adequate comfort level or baseline comfort level  6/21/2022 1411 by Princess Lala RN  Outcome: Progressing  6/21/2022 0207 by Geraldo Ny RN  Outcome: Progressing  Flowsheets  Taken 6/20/2022 2330  Verbalizes/displays adequate comfort level or baseline comfort level:   Encourage patient to monitor pain and request assistance   Assess pain using appropriate pain scale  Taken 6/20/2022 2015  Verbalizes/displays adequate comfort level or baseline comfort level:   Encourage patient to monitor pain and request assistance   Assess pain using appropriate pain scale     Problem: Safety - Adult  Goal: Free from fall injury  6/21/2022 1411 by Princess Lala RN  Outcome: Progressing  Flowsheets (Taken 6/21/2022 1407)  Free From Fall Injury: Based on caregiver fall risk screen, instruct family/caregiver to ask for assistance with transferring infant if caregiver noted to have fall risk factors  6/21/2022 0207 by Geraldo Ny RN  Outcome: Progressing  Flowsheets (Taken 6/21/2022 0204)  Free From Fall Injury:   Instruct family/caregiver on patient safety   Based on caregiver fall risk screen, instruct family/caregiver to ask for assistance with transferring infant if caregiver noted to have fall risk factors     Problem: Skin/Tissue Integrity  Goal: Absence of new skin breakdown  Description: 1. Monitor for areas of redness and/or skin breakdown  2. Assess vascular access sites hourly  3. Every 4-6 hours minimum:  Change oxygen saturation probe site  4. Every 4-6 hours:  If on nasal continuous positive airway pressure, respiratory therapy assess nares and determine need for appliance change or resting period. 6/21/2022 1411 by Princess Lala RN  Outcome: Progressing  6/21/2022 0207 by Geraldo Ny RN  Outcome: Progressing     Problem: Skin/Tissue Integrity  Goal: Absence of new skin breakdown  Description: 1.   Monitor for areas of redness and/or skin breakdown  2. Assess vascular access sites hourly  3. Every 4-6 hours minimum:  Change oxygen saturation probe site  4. Every 4-6 hours:  If on nasal continuous positive airway pressure, respiratory therapy assess nares and determine need for appliance change or resting period.   6/21/2022 1411 by Aniya Steele RN  Outcome: Progressing  6/21/2022 0207 by Tucker Barriga RN  Outcome: Progressing     Problem: Chronic Conditions and Co-morbidities  Goal: Patient's chronic conditions and co-morbidity symptoms are monitored and maintained or improved  6/21/2022 1411 by Aniya Steele RN  Outcome: Progressing  6/21/2022 0207 by Tucker Barriga RN  Outcome: Progressing  Flowsheets (Taken 6/20/2022 2000)  Care Plan - Patient's Chronic Conditions and Co-Morbidity Symptoms are Monitored and Maintained or Improved:   Monitor and assess patient's chronic conditions and comorbid symptoms for stability, deterioration, or improvement   Collaborate with multidisciplinary team to address chronic and comorbid conditions and prevent exacerbation or deterioration   Update acute care plan with appropriate goals if chronic or comorbid symptoms are exacerbated and prevent overall improvement and discharge     Problem: Respiratory - Adult  Goal: Achieves optimal ventilation and oxygenation  6/21/2022 1411 by Aniya Steele RN  Outcome: Progressing  6/21/2022 0207 by Tucker Barriga RN  Outcome: Progressing  Flowsheets (Taken 6/20/2022 2000)  Achieves optimal ventilation and oxygenation:   Assess for changes in respiratory status   Assess for changes in mentation and behavior   Position to facilitate oxygenation and minimize respiratory effort   Oxygen supplementation based on oxygen saturation or arterial blood gases   Initiate smoking cessation protocol as indicated   Encourage broncho-pulmonary hygiene including cough, deep breathe, incentive spirometry   Assess the need for suctioning and aspirate as needed   Assess and instruct to report shortness of breath or any respiratory difficulty     Problem: Cardiovascular - Adult  Goal: Maintains optimal cardiac output and hemodynamic stability  6/21/2022 1411 by Moni Bermudez RN  Outcome: Progressing  6/21/2022 0207 by Kenya Gutierres RN  Outcome: Progressing  Flowsheets (Taken 6/20/2022 2000)  Maintains optimal cardiac output and hemodynamic stability:   Monitor blood pressure and heart rate   Monitor urine output and notify Licensed Independent Practitioner for values outside of normal range   Assess for signs of decreased cardiac output   Administer fluid and/or volume expanders as ordered   Administer vasoactive medications as ordered   For PPHN infants, administer sedation as ordered and minimize all controllable stressors.   Goal: Absence of cardiac dysrhythmias or at baseline  6/21/2022 1411 by Moni Bermudez RN  Outcome: Progressing  6/21/2022 0207 by Kenya Gutierres RN  Outcome: Progressing  Flowsheets (Taken 6/20/2022 2000)  Absence of cardiac dysrhythmias or at baseline:   Monitor cardiac rate and rhythm   Assess for signs of decreased cardiac output   Administer antiarrhythmia medication and electrolyte replacement as ordered     Problem: Skin/Tissue Integrity - Adult  Goal: Skin integrity remains intact  6/21/2022 1411 by Moni Bermudez RN  Outcome: Progressing  Flowsheets (Taken 6/21/2022 1409)  Skin Integrity Remains Intact: Monitor for areas of redness and/or skin breakdown  6/21/2022 0207 by Kenya Gutierres RN  Outcome: Progressing  Flowsheets  Taken 6/21/2022 0204  Skin Integrity Remains Intact:   Monitor for areas of redness and/or skin breakdown   Assess vascular access sites hourly  Taken 6/20/2022 2000  Skin Integrity Remains Intact:   Monitor for areas of redness and/or skin breakdown   Assess vascular access sites hourly   Every 4-6 hours minimum: Change oxygen saturation probe site   Every 4-6 hours: If on nasal continuous positive airway pressure, respiratory therapy assesses nares and determine need for appliance change or resting period  Goal: Incisions, wounds, or drain sites healing without S/S of infection  6/21/2022 1411 by Princess Lala RN  Outcome: Progressing  Flowsheets (Taken 6/21/2022 1409)  Incisions, Wounds, or Drain Sites Healing Without Sign and Symptoms of Infection: TWICE DAILY: Assess and document skin integrity  6/21/2022 0207 by Geraldo Ny RN  Outcome: Progressing  Flowsheets  Taken 6/21/2022 0204  Incisions, Wounds, or Drain Sites Healing Without Sign and Symptoms of Infection:   TWICE DAILY: Assess and document skin integrity   TWICE DAILY: Assess and document dressing/incision, wound bed, drain sites and surrounding tissue  Taken 6/20/2022 2000  Incisions, Wounds, or Drain Sites Healing Without Sign and Symptoms of Infection: TWICE DAILY: Assess and document skin integrity  Goal: Oral mucous membranes remain intact  6/21/2022 1411 by Princess Lala RN  Outcome: Progressing  Flowsheets (Taken 6/21/2022 1409)  Oral Mucous Membranes Remain Intact: Assess oral mucosa and hygiene practices  6/21/2022 0207 by Geraldo Ny RN  Outcome: Progressing  Flowsheets  Taken 6/21/2022 0204  Oral Mucous Membranes Remain Intact:   Assess oral mucosa and hygiene practices   Implement preventative oral hygiene regimen   Implement oral medicated treatments as ordered  Taken 6/20/2022 2000  Oral Mucous Membranes Remain Intact:   Assess oral mucosa and hygiene practices   Implement preventative oral hygiene regimen   Implement oral medicated treatments as ordered     Problem: Musculoskeletal - Adult  Goal: Return mobility to safest level of function  6/21/2022 1411 by Princess Lala RN  Outcome: Progressing  6/21/2022 0207 by Geraldo Ny RN  Outcome: Progressing  Goal: Maintain proper alignment of affected body part  6/21/2022 1411 by Princess Lala RN  Outcome: Progressing  6/21/2022 0207 by Geraldo Ny RN  Outcome: Progressing  Goal: Return ADL status to a safe level of function  6/21/2022 1411 by Mike Malone RN  Outcome: Progressing  6/21/2022 0207 by Ana Fajardo RN  Outcome: Progressing     Problem: Genitourinary - Adult  Goal: Absence of urinary retention  6/21/2022 1411 by Mike Malone RN  Outcome: Progressing  6/21/2022 0207 by Ana Fajardo RN  Outcome: Progressing  Flowsheets (Taken 6/20/2022 2000)  Absence of urinary retention:   Assess patients ability to void and empty bladder   Monitor intake/output and perform bladder scan as needed   Place urinary catheter per Licensed Independent Practitioner order if needed   Discuss with Licensed Independent Practitioner  medications to alleviate retention as needed   Discuss catheterization for long term situations as appropriate     Problem: Infection - Adult  Goal: Absence of infection at discharge  6/21/2022 1411 by Mike Malone RN  Outcome: Progressing  6/21/2022 0207 by Ana Fajardo RN  Outcome: Progressing  Flowsheets (Taken 6/20/2022 2000)  Absence of infection at discharge: Assess and monitor for signs and symptoms of infection  Goal: Absence of infection during hospitalization  6/21/2022 1411 by Mike Malone RN  Outcome: Progressing  6/21/2022 0207 by Ana Fajardo RN  Outcome: Progressing  Flowsheets (Taken 6/20/2022 2000)  Absence of infection during hospitalization: Assess and monitor for signs and symptoms of infection  Goal: Absence of fever/infection during anticipated neutropenic period  6/21/2022 1411 by Mike Malone RN  Outcome: Progressing  6/21/2022 0207 by Ana Fajardo RN  Outcome: Progressing  Flowsheets (Taken 6/20/2022 2000)  Absence of fever/infection during anticipated neutropenic period: Monitor white blood cell count     Problem: Metabolic/Fluid and Electrolytes - Adult  Goal: Electrolytes maintained within normal limits  6/21/2022 1411 by Mike Malone RN  Outcome: Progressing  6/21/2022 0207 by Thang Shields Carmelo Cedeno RN  Outcome: Progressing  Flowsheets (Taken 6/20/2022 2000)  Electrolytes maintained within normal limits: Monitor labs and assess patient for signs and symptoms of electrolyte imbalances  Goal: Hemodynamic stability and optimal renal function maintained  6/21/2022 1411 by See Martini RN  Outcome: Progressing  6/21/2022 0207 by Ronak Crowell RN  Outcome: Progressing  Flowsheets (Taken 6/20/2022 2000)  Hemodynamic stability and optimal renal function maintained:   Monitor labs and assess for signs and symptoms of volume excess or deficit   Monitor intake, output and patient weight  Goal: Glucose maintained within prescribed range  6/21/2022 1411 by See Martini RN  Outcome: Progressing  6/21/2022 0207 by Ronka Crowell RN  Outcome: Progressing  Flowsheets (Taken 6/20/2022 2000)  Glucose maintained within prescribed range: Monitor blood glucose as ordered     Problem: Hematologic - Adult  Goal: Maintains hematologic stability  6/21/2022 1411 by See Martini RN  Outcome: Progressing  6/21/2022 0207 by Ronak Crowell RN  Outcome: Progressing  Flowsheets (Taken 6/20/2022 2000)  Maintains hematologic stability: Assess for signs and symptoms of bleeding or hemorrhage     Problem: ABCDS Injury Assessment  Goal: Absence of physical injury  6/21/2022 1411 by See Martini RN  Outcome: Progressing  6/21/2022 0207 by Ronak Crowell RN  Outcome: Progressing  Flowsheets (Taken 6/21/2022 0204)  Absence of Physical Injury: Implement safety measures based on patient assessment

## 2022-06-22 LAB
ALBUMIN SERPL-MCNC: 3.6 G/DL (ref 3.4–5)
ANION GAP SERPL CALCULATED.3IONS-SCNC: 12 MMOL/L (ref 3–16)
BASOPHILS ABSOLUTE: 0.1 K/UL (ref 0–0.2)
BASOPHILS RELATIVE PERCENT: 1.1 %
BUN BLDV-MCNC: 31 MG/DL (ref 7–20)
CALCIUM SERPL-MCNC: 9.1 MG/DL (ref 8.3–10.6)
CHLORIDE BLD-SCNC: 104 MMOL/L (ref 99–110)
CO2: 21 MMOL/L (ref 21–32)
CREAT SERPL-MCNC: 0.9 MG/DL (ref 0.8–1.3)
EOSINOPHILS ABSOLUTE: 0.8 K/UL (ref 0–0.6)
EOSINOPHILS RELATIVE PERCENT: 9.9 %
GFR AFRICAN AMERICAN: >60
GFR NON-AFRICAN AMERICAN: >60
GLUCOSE BLD-MCNC: 130 MG/DL (ref 70–99)
GLUCOSE BLD-MCNC: 161 MG/DL (ref 70–99)
GLUCOSE BLD-MCNC: 161 MG/DL (ref 70–99)
GLUCOSE BLD-MCNC: 162 MG/DL (ref 70–99)
GLUCOSE BLD-MCNC: 227 MG/DL (ref 70–99)
HCT VFR BLD CALC: 39 % (ref 40.5–52.5)
HEMOGLOBIN: 12.5 G/DL (ref 13.5–17.5)
LYMPHOCYTES ABSOLUTE: 1.9 K/UL (ref 1–5.1)
LYMPHOCYTES RELATIVE PERCENT: 22.7 %
MCH RBC QN AUTO: 27.3 PG (ref 26–34)
MCHC RBC AUTO-ENTMCNC: 32.1 G/DL (ref 31–36)
MCV RBC AUTO: 85 FL (ref 80–100)
MONOCYTES ABSOLUTE: 0.9 K/UL (ref 0–1.3)
MONOCYTES RELATIVE PERCENT: 10.4 %
MRSA CULTURE ONLY: ABNORMAL
NEUTROPHILS ABSOLUTE: 4.6 K/UL (ref 1.7–7.7)
NEUTROPHILS RELATIVE PERCENT: 55.9 %
ORGANISM: ABNORMAL
ORGANISM: ABNORMAL
PDW BLD-RTO: 16.8 % (ref 12.4–15.4)
PERFORMED ON: ABNORMAL
PHOSPHORUS: 3.7 MG/DL (ref 2.5–4.9)
PLATELET # BLD: 368 K/UL (ref 135–450)
PMV BLD AUTO: 8.5 FL (ref 5–10.5)
POTASSIUM SERPL-SCNC: 4.8 MMOL/L (ref 3.5–5.1)
RBC # BLD: 4.59 M/UL (ref 4.2–5.9)
SODIUM BLD-SCNC: 137 MMOL/L (ref 136–145)
URINE CULTURE, ROUTINE: ABNORMAL
WBC # BLD: 8.3 K/UL (ref 4–11)

## 2022-06-22 PROCEDURE — 6360000002 HC RX W HCPCS: Performed by: PHYSICIAN ASSISTANT

## 2022-06-22 PROCEDURE — 94760 N-INVAS EAR/PLS OXIMETRY 1: CPT

## 2022-06-22 PROCEDURE — 6370000000 HC RX 637 (ALT 250 FOR IP): Performed by: INTERNAL MEDICINE

## 2022-06-22 PROCEDURE — 6370000000 HC RX 637 (ALT 250 FOR IP): Performed by: HOSPITALIST

## 2022-06-22 PROCEDURE — 6370000000 HC RX 637 (ALT 250 FOR IP): Performed by: NURSE PRACTITIONER

## 2022-06-22 PROCEDURE — 99232 SBSQ HOSP IP/OBS MODERATE 35: CPT | Performed by: INTERNAL MEDICINE

## 2022-06-22 PROCEDURE — 6370000000 HC RX 637 (ALT 250 FOR IP): Performed by: PHYSICIAN ASSISTANT

## 2022-06-22 PROCEDURE — 36415 COLL VENOUS BLD VENIPUNCTURE: CPT

## 2022-06-22 PROCEDURE — 6360000002 HC RX W HCPCS: Performed by: INTERNAL MEDICINE

## 2022-06-22 PROCEDURE — 93005 ELECTROCARDIOGRAM TRACING: CPT | Performed by: NURSE PRACTITIONER

## 2022-06-22 PROCEDURE — 85025 COMPLETE CBC W/AUTO DIFF WBC: CPT

## 2022-06-22 PROCEDURE — 6360000002 HC RX W HCPCS: Performed by: NURSE PRACTITIONER

## 2022-06-22 PROCEDURE — 2580000003 HC RX 258: Performed by: INTERNAL MEDICINE

## 2022-06-22 PROCEDURE — 80069 RENAL FUNCTION PANEL: CPT

## 2022-06-22 PROCEDURE — 1200000000 HC SEMI PRIVATE

## 2022-06-22 PROCEDURE — 2580000003 HC RX 258: Performed by: PHYSICIAN ASSISTANT

## 2022-06-22 RX ORDER — OXYCODONE HYDROCHLORIDE AND ACETAMINOPHEN 5; 325 MG/1; MG/1
1 TABLET ORAL
Status: COMPLETED | OUTPATIENT
Start: 2022-06-22 | End: 2022-06-22

## 2022-06-22 RX ORDER — KETOROLAC TROMETHAMINE 30 MG/ML
15 INJECTION, SOLUTION INTRAMUSCULAR; INTRAVENOUS EVERY 6 HOURS PRN
Status: COMPLETED | OUTPATIENT
Start: 2022-06-22 | End: 2022-06-24

## 2022-06-22 RX ORDER — DIPHENHYDRAMINE HCL 25 MG
25 TABLET ORAL EVERY 6 HOURS PRN
Status: DISCONTINUED | OUTPATIENT
Start: 2022-06-22 | End: 2022-06-24 | Stop reason: HOSPADM

## 2022-06-22 RX ADMIN — KETOROLAC TROMETHAMINE 15 MG: 30 INJECTION, SOLUTION INTRAMUSCULAR at 21:59

## 2022-06-22 RX ADMIN — CLOPIDOGREL BISULFATE 75 MG: 75 TABLET ORAL at 10:00

## 2022-06-22 RX ADMIN — ENOXAPARIN SODIUM 40 MG: 100 INJECTION SUBCUTANEOUS at 10:01

## 2022-06-22 RX ADMIN — LOSARTAN POTASSIUM 50 MG: 25 TABLET, FILM COATED ORAL at 10:00

## 2022-06-22 RX ADMIN — ATORVASTATIN CALCIUM 80 MG: 80 TABLET, FILM COATED ORAL at 10:00

## 2022-06-22 RX ADMIN — SODIUM CHLORIDE 25 ML: 9 INJECTION, SOLUTION INTRAVENOUS at 16:01

## 2022-06-22 RX ADMIN — DULOXETINE HYDROCHLORIDE 30 MG: 30 CAPSULE, DELAYED RELEASE ORAL at 21:02

## 2022-06-22 RX ADMIN — PREGABALIN 150 MG: 75 CAPSULE ORAL at 10:00

## 2022-06-22 RX ADMIN — OXYCODONE AND ACETAMINOPHEN 1 TABLET: 5; 325 TABLET ORAL at 10:06

## 2022-06-22 RX ADMIN — INSULIN LISPRO 5 UNITS: 100 INJECTION, SOLUTION INTRAVENOUS; SUBCUTANEOUS at 10:07

## 2022-06-22 RX ADMIN — INSULIN LISPRO 1 UNITS: 100 INJECTION, SOLUTION INTRAVENOUS; SUBCUTANEOUS at 21:04

## 2022-06-22 RX ADMIN — METOPROLOL SUCCINATE 50 MG: 50 TABLET, EXTENDED RELEASE ORAL at 10:01

## 2022-06-22 RX ADMIN — PIPERACILLIN AND TAZOBACTAM 3375 MG: 3; .375 INJECTION, POWDER, LYOPHILIZED, FOR SOLUTION INTRAVENOUS at 06:11

## 2022-06-22 RX ADMIN — OXYCODONE AND ACETAMINOPHEN 1 TABLET: 5; 325 TABLET ORAL at 21:03

## 2022-06-22 RX ADMIN — ASPIRIN 81 MG: 81 TABLET, COATED ORAL at 10:00

## 2022-06-22 RX ADMIN — INSULIN LISPRO 2 UNITS: 100 INJECTION, SOLUTION INTRAVENOUS; SUBCUTANEOUS at 10:07

## 2022-06-22 RX ADMIN — INSULIN LISPRO 5 UNITS: 100 INJECTION, SOLUTION INTRAVENOUS; SUBCUTANEOUS at 18:28

## 2022-06-22 RX ADMIN — OXYCODONE HYDROCHLORIDE AND ACETAMINOPHEN 1 TABLET: 5; 325 TABLET ORAL at 03:11

## 2022-06-22 RX ADMIN — PREGABALIN 150 MG: 75 CAPSULE ORAL at 21:02

## 2022-06-22 RX ADMIN — OXYCODONE AND ACETAMINOPHEN 1 TABLET: 5; 325 TABLET ORAL at 01:44

## 2022-06-22 RX ADMIN — DIPHENHYDRAMINE HCL 25 MG: 25 TABLET ORAL at 15:56

## 2022-06-22 RX ADMIN — DIPHENHYDRAMINE HYDROCHLORIDE 25 MG: 25 TABLET ORAL at 21:58

## 2022-06-22 RX ADMIN — INSULIN LISPRO 5 UNITS: 100 INJECTION, SOLUTION INTRAVENOUS; SUBCUTANEOUS at 13:26

## 2022-06-22 RX ADMIN — OXYCODONE AND ACETAMINOPHEN 1 TABLET: 5; 325 TABLET ORAL at 15:57

## 2022-06-22 RX ADMIN — INSULIN GLARGINE 20 UNITS: 100 INJECTION, SOLUTION SUBCUTANEOUS at 21:04

## 2022-06-22 RX ADMIN — PIPERACILLIN AND TAZOBACTAM 3375 MG: 3; .375 INJECTION, POWDER, LYOPHILIZED, FOR SOLUTION INTRAVENOUS at 16:04

## 2022-06-22 RX ADMIN — DIPHENHYDRAMINE HYDROCHLORIDE 25 MG: 25 TABLET ORAL at 01:44

## 2022-06-22 RX ADMIN — LINEZOLID 600 MG: 600 TABLET, FILM COATED ORAL at 21:03

## 2022-06-22 RX ADMIN — INSULIN LISPRO 4 UNITS: 100 INJECTION, SOLUTION INTRAVENOUS; SUBCUTANEOUS at 13:27

## 2022-06-22 RX ADMIN — MUPIROCIN: 20 OINTMENT TOPICAL at 10:23

## 2022-06-22 RX ADMIN — LOSARTAN POTASSIUM 50 MG: 25 TABLET, FILM COATED ORAL at 21:03

## 2022-06-22 RX ADMIN — SPIRONOLACTONE 25 MG: 25 TABLET ORAL at 10:00

## 2022-06-22 RX ADMIN — DULOXETINE HYDROCHLORIDE 30 MG: 30 CAPSULE, DELAYED RELEASE ORAL at 10:00

## 2022-06-22 RX ADMIN — LINEZOLID 600 MG: 600 TABLET, FILM COATED ORAL at 10:01

## 2022-06-22 ASSESSMENT — PAIN DESCRIPTION - LOCATION
LOCATION: CHEST;BACK
LOCATION: BACK;RIB CAGE
LOCATION: CHEST;BACK

## 2022-06-22 ASSESSMENT — PAIN DESCRIPTION - DESCRIPTORS
DESCRIPTORS: ACHING
DESCRIPTORS: SHARP

## 2022-06-22 ASSESSMENT — PAIN DESCRIPTION - ORIENTATION
ORIENTATION: RIGHT

## 2022-06-22 ASSESSMENT — PAIN SCALES - GENERAL
PAINLEVEL_OUTOF10: 10
PAINLEVEL_OUTOF10: 8
PAINLEVEL_OUTOF10: 8
PAINLEVEL_OUTOF10: 10
PAINLEVEL_OUTOF10: 10
PAINLEVEL_OUTOF10: 6
PAINLEVEL_OUTOF10: 10

## 2022-06-22 NOTE — PROGRESS NOTES
Nutrition Note    RECOMMENDATIONS  1. PO Diet: Continue current diet   2. ONS: None  3. Nutrition Support: None      NUTRITION ASSESSMENT   Pt triggered positive nursing nutrition screen for wounds. Pt with multiple wounds including diabetic, unstageable, and stage II. Pt also with recent L AKA. Pt reports excellent appetite and PO intake, consuming % of meals. Discussed importance of protein for wound healing. Pt reports consuming protein at every meal. No need for oral nutrition supplement at this time. Deemed to be at low nutrition risk at this time. Will continue to monitor for changes in status.  Nutrition Related Findings: No edema noted. Appears adequately nourished.  Wounds: Stage II,Unstageable,Skin Tears,Diabetic Ulcer   Nutrition Education:  Education not indicated     MALNUTRITION ASSESSMENT   Malnutrition Status: No malnutrition    NUTRITION DIAGNOSIS   · Increased nutrient needs related to increase demand for energy/nutrients as evidenced by wounds    CURRENT NUTRITION THERAPIES  ADULT DIET; Regular; 5 carb choices (75 gm/meal)     PO Intake: %   PO Supplement Intake:None Ordered    ANTHROPOMETRICS   Current Height: 5' 8\" (172.7 cm)   Current Weight: 165 lb 12.8 oz (75.2 kg)     Ideal Body Weight (IBW): 154 lbs  (70 kg)      BMI: 25.2    The patient will be monitored per nutrition standards of care. Consult dietitian if additional nutrition interventions are needed prior to RD reassessment.      Asha Ibarra, [address],     Contact: [phone]

## 2022-06-22 NOTE — PROGRESS NOTES
Infectious Diseases   Progress Note      Admission Date: 6/18/2022  Hospital Day: Hospital Day: 5   Attending: Nikita Lambert MD  Date of service: 6/22/2022     Chief complaint/ Reason for consult:     · Right diabetic foot infection  · Concern for urinary tract infection  · Longstanding type 2 diabetes mellitus-poorly controlled  · Chronic indwelling Naylor catheter  · History of sacral decubitus ulcer  · Diabetic polyneuropathy type II  · Essential hypertension    Microbiology:        I have reviewed allavailable micro lab data and cultures    · Blood culture (2/2) - collected on 6/18/2022: Negative    · Urine culture  - collected on 6/18/2022: 50,000 cfu per mL MRSA      Antibiotics and immunizations:       Current antibiotics: All antibiotics and their doses were reviewed by me    Recent Abx Admin                   linezolid (ZYVOX) tablet 600 mg (mg) 600 mg Given 06/22/22 1001     600 mg Given 06/21/22 2016    piperacillin-tazobactam (ZOSYN) 3,375 mg in dextrose 5 % 50 mL IVPB extended infusion (mini-bag) (mg) 3,375 mg New Bag 06/22/22 0611     3,375 mg New Bag 06/21/22 2024                  Immunization History: All immunization history was reviewed by me today. Immunization History   Administered Date(s) Administered    COVID-19, Ann Miramontes, Primary or Immunocompromised, PF, 100mcg/0.5mL 04/13/2021, 05/11/2021       Known drug allergies: All allergies were reviewed and updated    Allergies   Allergen Reactions    Amiodarone     Bactrim [Sulfamethoxazole-Trimethoprim] Hives       Social history:     Social History:  All social andepidemiologic history was reviewed and updated by me today as needed. · Tobacco use:   reports that he has been smoking. He has a 80.00 pack-year smoking history. He has never used smokeless tobacco.  · Alcohol use:   reports previous alcohol use. · Currently lives in: [place] [address]  ·  reports no history of drug use.      COVID VACCINATION AND LAB RESULT RECORDS:     Internal Administration   First Dose COVID-19, Moderna, Primary or Immunocompromised, PF, 100mcg/0.5mL  04/13/2021   Second Dose COVID-19, Lawson Dupont, Primary or Immunocompromised, PF, 100mcg/0.5mL   05/11/2021       Last COVID Lab SARS-CoV-2, NAAT (no units)   Date Value   04/19/2021 Not Detected            Assessment:     The patient is a 63 y.o. old male who  has a past medical history of CAD (coronary artery disease), Diabetes mellitus ([facility] Empyema ([facility] GERD (gastroesophageal reflux disease), Hyperlipidemia, Hypertension, Low back pain, MI (myocardial infarction) ([facility] PVD (peripheral vascular disease) ([facility] [facility] Sleep apnea, and TIA (transient ischemic attack). with following problems:    · Right diabetic foot infection-covered with linezolid and Zosyn  · Concern for urinary tract infection  · Longstanding type 2 diabetes mellitus-maintain good glucose control  · Chronic indwelling Naylor catheter  · History of sacral decubitus ulcer  · Diabetic polyneuropathy type II  · Essential hypertension-blood pressure okay  · Coronary artery disease s/p CABG  · History of falls at home resulting in multiple rib fractures  · Hyperlipidemia  · Coronary artery disease  · Gastroesophageal reflux disease  · History of cholecystectomy. ·  History of left above-knee amputation  ·       Discussion:      The patient is afebrile. She is on IV linezolid and IV Zosyn. Urine culture has grown MRSA. Right leg wound culture from yesterday is in process. Blood cultures are in process. Serum creatinine 0.9. White cell count 8300. Plan:     Diagnostic Workup:      · Continue to follow  fever curve, WBC count and blood cultures. · Continue to monitor blood counts, liver and renal function.     Antimicrobials:    · Will continue oral linezolid 600 mg every 12 hour  · Will continue IV Zosyn 3.375 g every 8 hours  · We will follow up on the culture results and clinical progress and will make further swelling. Gastrointestinal: Negative for abdominal pain, constipation, diarrhea and nausea. Endocrine: Negative for polyuria. Genitourinary: Negative for dysuria, flank pain, frequency, hematuria and urgency. Musculoskeletal: Negative for back pain and myalgias. Skin: Negative for rash. Neurological: Negative for dizziness, seizures and headaches. Hematological: Does not bruise/bleed easily. Psychiatric/Behavioral: Negative for hallucinations and suicidal ideas. All other systems reviewed and are negative. Past Medical History: All past medical history reviewed today. Past Medical History:   Diagnosis Date    CAD (coronary artery disease)     Diabetes mellitus ([facility]     Empyema ([facility]     GERD (gastroesophageal reflux disease)     Hyperlipidemia     Hypertension     Low back pain     MI (myocardial infarction) ([facility]     [facility]VD [facility]peripheral [facility]     Sleep apnea     TIA (transient ischemic attack)        Past Surgical History: All past surgical history was reviewed today. Past Surgical History:   Procedure Laterality Date    BACK INJECTION      CORONARY ARTERY BYPASS GRAFT      OTHER SURGICAL HISTORY      right leg vascular stents    SIGMOIDOSCOPY N/A 4/19/2021    SIGMOIDOSCOPY DIAGNOSTIC FLEXIBLE performed by Nory Diallo MD at [facility] Right     UPPER GASTROINTESTINAL ENDOSCOPY N/A 4/19/2021    EGD BIOPSY performed by Nory Diallo MD at [address] [place] [address]       Family History: All family history was reviewed today. History reviewed. No pertinent family history. Objective:       PHYSICAL EXAM:      Vitals:   Vitals:    06/22/22 0309 06/22/22 0712 06/22/22 0945 06/22/22 1204   BP: 130/81  (!) 147/87    Pulse: 72  71    Resp: 16 16 16    Temp: 97.8 °F (36.6 °C)      TempSrc: Oral      SpO2: 96% 96% 97%    Weight:       Height:    5' 8\" (1.727 m)       Physical Exam  Vitals and nursing note reviewed.    Constitutional: Appearance: Normal appearance. He is well-developed. HENT:      Head: Normocephalic and atraumatic. Right Ear: External ear normal.      Left Ear: External ear normal.      Nose: Nose normal. No congestion or rhinorrhea. Mouth/Throat:      Mouth: Mucous membranes are moist.      Pharynx: No oropharyngeal exudate or posterior oropharyngeal erythema. Eyes:      General: No scleral icterus. Right eye: No discharge. Left eye: No discharge. Conjunctiva/sclera: Conjunctivae normal.      Pupils: Pupils are equal, round, and reactive to light. Cardiovascular:      Rate and Rhythm: Normal rate and regular rhythm. Pulses: Normal pulses. Heart sounds: No murmur heard. No friction rub. Pulmonary:      Effort: Pulmonary effort is normal. No respiratory distress. Breath sounds: Normal breath sounds. No stridor. No wheezing, rhonchi or rales. Abdominal:      General: Bowel sounds are normal.      Palpations: Abdomen is soft. Tenderness: There is no abdominal tenderness. There is no right CVA tenderness, left CVA tenderness, guarding or rebound. Musculoskeletal:         General: No swelling or tenderness. Normal range of motion. Cervical back: Normal range of motion and neck supple. No rigidity. No muscular tenderness. Lymphadenopathy:      Cervical: No cervical adenopathy. Skin:     General: Skin is warm and dry. Coloration: Skin is not jaundiced. Findings: No erythema or rash. Comments: Right lower leg wound noted again   Neurological:      General: No focal deficit present. Mental Status: He is alert and oriented to person, place, and time. Mental status is at baseline. Motor: No abnormal muscle tone. Psychiatric:         Mood and Affect: Mood normal.         Behavior: Behavior normal.         Thought Content:  Thought content normal.            Lines and drains: All vascular access sites are healthy with no local erythema, discharge or tenderness. Intake and output:    I/O last 3 completed shifts: In: 602.3 [P.O.:360; I.V.:20; IV Piggyback:222.3]  Out: -     Lab Data:   All available labs and old records have been reviewed by me. CBC:  Recent Labs     06/20/22  0642 06/22/22  1015   WBC 10.3 8.3   RBC 4.38 4.59   HGB 11.8* 12.5*   HCT 36.5* 39.0*    368   MCV 83.4 85.0   MCH 27.0 27.3   MCHC 32.4 32.1   RDW 16.8* 16.8*        BMP:  Recent Labs     06/20/22  0642 06/22/22  1014   * 137   K 3.7 4.8    104   CO2 22 21   BUN 26* 31*   CREATININE 0.8 0.9   CALCIUM 8.9 9.1   GLUCOSE 242* 162*        Hepatic Function Panel:   Lab Results   Component Value Date    ALKPHOS 264 06/18/2022    ALT 10 06/18/2022    AST 13 06/18/2022    PROT 7.8 06/18/2022    BILITOT 0.3 06/18/2022    BILIDIR <0.2 06/18/2022    IBILI see below 06/18/2022    LABALBU 3.6 06/22/2022       CPK: No results found for: CKTOTAL  ESR: No results found for: SEDRATE  CRP: No results found for: CRP        Imaging: All pertinent images and reports for the current visit were reviewed by me during this visit. I reviewed the chest x-ray/CT scan/MRI images and independently interpreted the findings and results today. XR FOOT RIGHT (2 VIEWS)   Final Result   No osteomyelitis or acute osseous injury is identified. CT CHEST ABDOMEN PELVIS WO CONTRAST   Final Result   1. Focal rib deformity seen in the right chest adjacent to the anterior   costochondral junction related to nondisplaced rib fractures, involving the   3rd, 4th, 5th and 6th ribs. These may be related to subacute fractures,   though correlate with point tenderness. Of note, no adjacent parenchymal   abnormality in the lungs to suggest pulmonary contusion and acute injury. No   pneumothorax. 2. No other acute process seen within the chest, abdomen or pelvis. 3. Focal apical pleural-parenchymal suspected scarring at the left lung apex.    4. Atherosclerotic disease including coronary artery involvement, with   postoperative change from prior CABG. 5. Cholecystectomy. 6. Bladder wall thickening, which may be related to poor distention or   possibly cystitis. A small amount of intraluminal air may be related to   recent instrumentation. 7. Prostate enlargement. 8. Multilevel degenerative changes are seen throughout the thoracic and   lumbar spine, greatest at the level of L4-L5. CT HEAD WO CONTRAST   Final Result   No acute intracranial abnormality. Patchy white matter low attenuation with intracranial atherosclerotic disease   suggestive of chronic microvascular ischemic change. Medications: All current and past medications were reviewed.      linezolid  600 mg Oral 2 times per day    piperacillin-tazobactam  3,375 mg IntraVENous Q8H    aspirin  81 mg Oral Daily    atorvastatin  80 mg Oral Daily    clopidogrel  75 mg Oral Daily    DULoxetine  30 mg Oral BID    metoprolol succinate  50 mg Oral Daily    pregabalin  150 mg Oral BID    spironolactone  25 mg Oral Daily    sodium chloride flush  5-40 mL IntraVENous 2 times per day    enoxaparin  40 mg SubCUTAneous Daily    losartan  50 mg Oral BID    insulin glargine  20 Units SubCUTAneous Nightly    insulin lispro  0-12 Units SubCUTAneous TID WC    insulin lispro  0-6 Units SubCUTAneous Nightly    insulin lispro  5 Units SubCUTAneous TID WC    mupirocin   Topical Daily    nicotine  1 patch TransDERmal Daily        sodium chloride      dextrose         oxyCODONE-acetaminophen, diphenhydrAMINE, sodium chloride flush, sodium chloride, ondansetron **OR** ondansetron, polyethylene glycol, acetaminophen **OR** acetaminophen, dextrose bolus **OR** dextrose bolus, glucagon (rDNA), dextrose      Problem list:       Patient Active Problem List   Diagnosis Code    Right hallux infection L08.9    Open wound of right great toe S91.101A    Frequent falls R29.6    Closed fracture of multiple ribs of right side with routine healing S22.41XD    History of left above knee amputation ([address] S16.370    Type 2 diabetes mellitus with right diabetic foot infection ([address] E11.628, L08.9    Urinary tract infection without hematuria N39.0    Chronic indwelling Naylor catheter Z97.8    Pressure injury of skin of sacral region L89.159    Diabetic polyneuropathy associated with type 2 diabetes mellitus ([address] E11.42    Essential hypertension I10    Coronary artery disease due to lipid rich plaque I25.10, I25.83    Gastroesophageal reflux disease without esophagitis K21.9    History of cholecystectomy Z90.49       Please note that this chart was generated using Dragon dictation software. Although every effort was made to ensure the accuracy of this automated transcription, some errors in transcription may have occurred inadvertently. If you may need any clarification, please do not hesitate to contact me through EPIC or at the phone number provided below with my electronic signature. Any pictures or media included in this note were obtained after taking informed verbal consent from the patient and with their approval to include those in the patient's medical record.       Carson Garcia MD, MPH, Emma Castaneda  6/22/2022, 2:31 PM  [facility] Infectious Disease   [address] [facility], [address] [place] [address]  Office: [phone]  Fax: [phone]  Clinic days:  Tuesday & Thursday

## 2022-06-22 NOTE — PLAN OF CARE
Problem: Pain  Goal: Verbalizes/displays adequate comfort level or baseline comfort level  6/21/2022 2325 by Estefanía Penaloza RN  Outcome: Progressing     Problem: Safety - Adult  Goal: Free from fall injury  6/21/2022 2325 by Estefanía Penaloza RN  Outcome: Progressing     Problem: Skin/Tissue Integrity  Goal: Absence of new skin breakdown  Description: 1. Monitor for areas of redness and/or skin breakdown  2. Assess vascular access sites hourly  3. Every 4-6 hours minimum:  Change oxygen saturation probe site  4. Every 4-6 hours:  If on nasal continuous positive airway pressure, respiratory therapy assess nares and determine need for appliance change or resting period.   6/21/2022 2325 by Estefanía Penaloza RN  Outcome: Progressing     Problem: Chronic Conditions and Co-morbidities  Goal: Patient's chronic conditions and co-morbidity symptoms are monitored and maintained or improved  6/21/2022 2325 by Estefanía Penaloza RN  Outcome: Progressing     Problem: Musculoskeletal - Adult  Goal: Return mobility to safest level of function  6/21/2022 2325 by Estefanía Penaloza RN  Outcome: Progressing  Goal: Maintain proper alignment of affected body part  6/21/2022 2325 by Estefanía Penaloza RN  Outcome: Progressing  Goal: Return ADL status to a safe level of function  6/21/2022 2325 by Estefanía Penaloza RN  Outcome: Progressing

## 2022-06-22 NOTE — PROGRESS NOTES
Patients head to toe assessment completed. Vital signs WNL. Bed alarm engaged, call light within reach. Scheduled medications given per MAR. Patient complain of generalized pain, rates pain 9/10. PRN Percocet given. Patient resting in bed. Will continue to monitor.

## 2022-06-22 NOTE — PROGRESS NOTES
Patient very needy, hands on call light. Complain of pain 10/10 after after pain med given. Hosp notified. One time Percocet ordered. Ice pack given. Vitals signs WNL. Abbie care completed. Diaper and bed pad changed. Patient repositioned in bed.  Will continue to monitor

## 2022-06-22 NOTE — PROGRESS NOTES
Shift assessment completed. Routine vitals obtained. Scheduled medications given. Patient is awake, alert and oriented. Patient c/o pain, 8/10. PRN percocet administered. Wound dressing changed. Bactroban applied, dry dressing applied over top. Brief changed. Zinc paste applied. Baby powder applied. Call light within reach. No further needs expressed.

## 2022-06-22 NOTE — PROGRESS NOTES
[facility] HOSPITALISTS PROGRESS NOTE    6/22/2022 8:04 AM        Name: Jackie Lee . Admitted: 6/18/2022  Primary Care Provider: No primary care provider on file. (Tel: None)          Subjective:    Patient was admitted with weakness, R great toe cellulitis. Wife was pushing him down their wheelchair ramp  when he went to fast and fell forward at bottom of the ramp falling on the ground on his R side. He was recently at [facility] with hyperglycemia but also has abnormal stress test. Cath was not performed-cards elected for maximizing medical therapy. He is living in a hotel with his wife. He has exhausted all his skilled days due to his recent left AKA  Not interested in transitioning to medicaid, bc then his wife would not have a place to stay. Seen this am after pain medications.   He was sleepy   Seen by ID on 6/21       Reviewed interval ancillary notes    Current Medications  oxyCODONE-acetaminophen (PERCOCET) 5-325 MG per tablet 1 tablet, Q4H PRN  linezolid (ZYVOX) tablet 600 mg, 2 times per day  diphenhydrAMINE (BENADRYL) tablet 25 mg, Nightly PRN  piperacillin-tazobactam (ZOSYN) 3,375 mg in dextrose 5 % 50 mL IVPB extended infusion (mini-bag), Q8H  aspirin EC tablet 81 mg, Daily  atorvastatin (LIPITOR) tablet 80 mg, Daily  clopidogrel (PLAVIX) tablet 75 mg, Daily  DULoxetine (CYMBALTA) extended release capsule 30 mg, BID  metoprolol succinate (TOPROL XL) extended release tablet 50 mg, Daily  pregabalin (LYRICA) capsule 150 mg, BID  spironolactone (ALDACTONE) tablet 25 mg, Daily  sodium chloride flush 0.9 % injection 5-40 mL, 2 times per day  sodium chloride flush 0.9 % injection 5-40 mL, PRN  0.9 % sodium chloride infusion, PRN  enoxaparin (LOVENOX) injection 40 mg, Daily  ondansetron (ZOFRAN-ODT) disintegrating tablet 4 mg, Q8H PRN   Or  ondansetron (ZOFRAN) injection 4 mg, Q6H PRN  polyethylene glycol (GLYCOLAX) packet 17 g, Daily PRN  acetaminophen (TYLENOL) tablet 650 mg, Q6H PRN   Or  acetaminophen (TYLENOL) suppository 650 mg, Q6H PRN  dextrose bolus 10% 125 mL, PRN   Or  dextrose bolus 10% 250 mL, PRN  glucagon (rDNA) injection 1 mg, PRN  dextrose 5 % solution, PRN  losartan (COZAAR) tablet 50 mg, BID  insulin glargine (LANTUS) injection vial 20 Units, Nightly  insulin lispro (HUMALOG) injection vial 0-12 Units, TID WC  insulin lispro (HUMALOG) injection vial 0-6 Units, Nightly  insulin lispro (HUMALOG) injection vial 5 Units, TID WC  mupirocin (BACTROBAN) 2 % ointment, Daily  nicotine (NICODERM CQ) 21 MG/24HR 1 patch, Daily        Objective:  /81   Pulse 72   Temp 97.8 °F (36.6 °C) (Oral)   Resp 16   Ht 5' 8\" (1.727 m)   Wt 165 lb 12.8 oz (75.2 kg)   SpO2 96%   BMI 25.21 kg/m²     Intake/Output Summary (Last 24 hours) at 6/22/2022 0804  Last data filed at 6/22/2022 0659  Gross per 24 hour   Intake 222.25 ml   Output --   Net 222.25 ml      Wt Readings from Last 3 Encounters:   06/19/22 165 lb 12.8 oz (75.2 kg)       General appearance:  Appears chronically ill and frail . Eyes: Sclera clear. Pupils equal.  ENT: Moist oral mucosa. Trachea midline, no adenopathy. Cardiovascular: Regular rhythm, normal S1, S2. No murmur. No edema in lower extremities  Respiratory: Not using accessory muscles. Good inspiratory effort. Clear to auscultation bilaterally, no wheeze or crackles. GI: Abdomen soft, no tenderness, not distended, normal bowel sounds  Musculoskeletal: No cyanosis in digits, neck supple  Neurology: CN 2-12 grossly intact. No speech or motor deficits  Psych: Normal affect. Alert and oriented in time, place and person  Skin: Warm, dry, poor turgor. Left stump is well healed. Right foot dressing removed. Several toes absent. Remaining toes with dried ulcerations with no current drainage.       Labs and Tests:  CBC:   Recent Labs     06/20/22  0642 06/22/22  1015   WBC 10.3 8.3 HGB 11.8* 12.5*    368     BMP:    Recent Labs     06/20/22  0642 06/22/22  1014   * 137   K 3.7 4.8    104   CO2 22 21   BUN 26* 31*   CREATININE 0.8 0.9   GLUCOSE 242* 162*     Hepatic:   No results for input(s): AST, ALT, ALB, BILITOT, ALKPHOS in the last 72 hours. Cardiology note from Dr Fredi Yates at [facility].     Stress examination with small apical reperfusion defect. Risk to benefit not in favor of invasive cardiac catheterization. Will concentrate on hypertensive urgency as presenting picture and maximizing medical therapy to assist with pressure control and improvement LVEF. Losartan increased to twice daily today, will observe clinical response. Results for Agueda Ibrahim (MRN [id number]) as of 6/22/2022 12:26   Ref. Range 6/21/2022 16:43 6/21/2022 19:58 6/22/2022 07:32 6/22/2022 11:38   POC Glucose Latest Ref Range: 70 - 99 mg/dl 122 (H) 178 (H) 161 (H) 227 (H)     AIC 8.2 on May 28 2022    No osteomyelitis or acute osseous injury is identified.           CT head:  No acute intracranial abnormality.       Patchy white matter low attenuation with intracranial atherosclerotic disease   suggestive of chronic microvascular ischemic change.         CT abd and pelvis    . Focal rib deformity seen in the right chest adjacent to the anterior   costochondral junction related to nondisplaced rib fractures, involving the   3rd, 4th, 5th and 6th ribs.  These may be related to subacute fractures,   though correlate with point tenderness.  Of note, no adjacent parenchymal   abnormality in the lungs to suggest pulmonary contusion and acute injury.  No   pneumothorax. 2. No other acute process seen within the chest, abdomen or pelvis. 3. Focal apical pleural-parenchymal suspected scarring at the left lung apex. 4. Atherosclerotic disease including coronary artery involvement, with   postoperative change from prior CABG. 5. Cholecystectomy.    6. Bladder wall thickening, which may be related to poor distention or   possibly cystitis.  A small amount of intraluminal air may be related to   recent instrumentation. 7. Prostate enlargement. 8. Multilevel degenerative changes are seen throughout the thoracic and   lumbar spine, greatest at the level of L4-L5.                     Problem List  Principal Problem:    Right hallux infection  Active Problems:    Open wound of right great toe    Frequent falls    Closed fracture of multiple ribs of right side with routine healing    History of left above knee amputation (HCC)    Type 2 diabetes mellitus with right diabetic foot infection ([address]    Urinary tract infection without hematuria    Chronic indwelling Naylor catheter    Pressure injury of skin of sacral region    Diabetic polyneuropathy associated with type 2 diabetes mellitus ([address]    Essential hypertension    Coronary artery disease due to lipid rich plaque    Gastroesophageal reflux disease without esophagitis    History of cholecystectomy  Resolved Problems:    * No resolved hospital problems. *       Assessment & Plan:   1. Right great toe cellulitis: on zosyn and now oral linezolid 600 q 12 hours ( MRSA in urine)  BC with no current growth. No OM per xray. Will ask ID for follow   2. Multiple rib fractures: IS added . Percocet added on 6/21, will also add toradol   3. UTI with MRSA: on linezolid  4. T2DM:  BG values reviewed . Continue with lantus 20 units at hs, 5 units prandial with correction as needed   5. CAD with recent abnormal stress test:  Follows at [facility] with Dr Marie Giron, see note above . Pt has had previous CABG  6. Pt currently living in a hotel with his wife. She anticipates moving to a hotel in the [address] [facility] area to be closer to his cardiologist etc.    7. Appreciate input from ID.  8. Likely ready for d/c in the next 1 - 2 days       Diet: ADULT DIET;  Regular; 5 carb choices (75 gm/meal)  Code:Full Code  DVT PPX      BENEDICT Urias - CNP   6/22/2022 8:04 AM

## 2022-06-23 LAB
BLOOD CULTURE, ROUTINE: NORMAL
CULTURE, BLOOD 2: NORMAL
EKG ATRIAL RATE: 70 BPM
EKG DIAGNOSIS: NORMAL
EKG P AXIS: 57 DEGREES
EKG P-R INTERVAL: 156 MS
EKG Q-T INTERVAL: 414 MS
EKG QRS DURATION: 116 MS
EKG QTC CALCULATION (BAZETT): 447 MS
EKG R AXIS: -18 DEGREES
EKG T AXIS: 84 DEGREES
EKG VENTRICULAR RATE: 70 BPM
GLUCOSE BLD-MCNC: 110 MG/DL (ref 70–99)
GLUCOSE BLD-MCNC: 120 MG/DL (ref 70–99)
GLUCOSE BLD-MCNC: 143 MG/DL (ref 70–99)
GLUCOSE BLD-MCNC: 148 MG/DL (ref 70–99)
PERFORMED ON: ABNORMAL

## 2022-06-23 PROCEDURE — 1200000000 HC SEMI PRIVATE

## 2022-06-23 PROCEDURE — 2500000003 HC RX 250 WO HCPCS: Performed by: INTERNAL MEDICINE

## 2022-06-23 PROCEDURE — 2580000003 HC RX 258: Performed by: INTERNAL MEDICINE

## 2022-06-23 PROCEDURE — 6370000000 HC RX 637 (ALT 250 FOR IP): Performed by: INTERNAL MEDICINE

## 2022-06-23 PROCEDURE — 93010 ELECTROCARDIOGRAM REPORT: CPT | Performed by: INTERNAL MEDICINE

## 2022-06-23 PROCEDURE — 6360000002 HC RX W HCPCS: Performed by: INTERNAL MEDICINE

## 2022-06-23 PROCEDURE — 6370000000 HC RX 637 (ALT 250 FOR IP): Performed by: PHYSICIAN ASSISTANT

## 2022-06-23 PROCEDURE — 6360000002 HC RX W HCPCS: Performed by: NURSE PRACTITIONER

## 2022-06-23 PROCEDURE — 6370000000 HC RX 637 (ALT 250 FOR IP): Performed by: HOSPITALIST

## 2022-06-23 PROCEDURE — 6360000002 HC RX W HCPCS: Performed by: PHYSICIAN ASSISTANT

## 2022-06-23 PROCEDURE — 99232 SBSQ HOSP IP/OBS MODERATE 35: CPT | Performed by: INTERNAL MEDICINE

## 2022-06-23 PROCEDURE — 2580000003 HC RX 258: Performed by: PHYSICIAN ASSISTANT

## 2022-06-23 PROCEDURE — 6370000000 HC RX 637 (ALT 250 FOR IP): Performed by: NURSE PRACTITIONER

## 2022-06-23 RX ORDER — OXYCODONE HYDROCHLORIDE AND ACETAMINOPHEN 5; 325 MG/1; MG/1
1 TABLET ORAL EVERY 6 HOURS PRN
Qty: 12 TABLET | Refills: 0 | Status: SHIPPED | OUTPATIENT
Start: 2022-06-23 | End: 2022-06-26

## 2022-06-23 RX ADMIN — METOPROLOL SUCCINATE 50 MG: 50 TABLET, EXTENDED RELEASE ORAL at 09:37

## 2022-06-23 RX ADMIN — KETOROLAC TROMETHAMINE 15 MG: 30 INJECTION, SOLUTION INTRAMUSCULAR at 15:02

## 2022-06-23 RX ADMIN — LOSARTAN POTASSIUM 50 MG: 25 TABLET, FILM COATED ORAL at 09:33

## 2022-06-23 RX ADMIN — OXYCODONE AND ACETAMINOPHEN 1 TABLET: 5; 325 TABLET ORAL at 20:32

## 2022-06-23 RX ADMIN — PIPERACILLIN AND TAZOBACTAM 3375 MG: 3; .375 INJECTION, POWDER, LYOPHILIZED, FOR SOLUTION INTRAVENOUS at 00:32

## 2022-06-23 RX ADMIN — PREGABALIN 150 MG: 75 CAPSULE ORAL at 09:36

## 2022-06-23 RX ADMIN — INSULIN LISPRO 5 UNITS: 100 INJECTION, SOLUTION INTRAVENOUS; SUBCUTANEOUS at 18:36

## 2022-06-23 RX ADMIN — DULOXETINE HYDROCHLORIDE 30 MG: 30 CAPSULE, DELAYED RELEASE ORAL at 09:35

## 2022-06-23 RX ADMIN — DIPHENHYDRAMINE HYDROCHLORIDE 25 MG: 25 TABLET ORAL at 20:31

## 2022-06-23 RX ADMIN — OXYCODONE AND ACETAMINOPHEN 1 TABLET: 5; 325 TABLET ORAL at 09:38

## 2022-06-23 RX ADMIN — ATORVASTATIN CALCIUM 80 MG: 80 TABLET, FILM COATED ORAL at 09:35

## 2022-06-23 RX ADMIN — LINEZOLID 600 MG: 600 TABLET, FILM COATED ORAL at 20:31

## 2022-06-23 RX ADMIN — INSULIN LISPRO 5 UNITS: 100 INJECTION, SOLUTION INTRAVENOUS; SUBCUTANEOUS at 13:02

## 2022-06-23 RX ADMIN — ACETAMINOPHEN 650 MG: 325 TABLET ORAL at 15:02

## 2022-06-23 RX ADMIN — LOSARTAN POTASSIUM 50 MG: 25 TABLET, FILM COATED ORAL at 20:31

## 2022-06-23 RX ADMIN — SPIRONOLACTONE 25 MG: 25 TABLET ORAL at 09:34

## 2022-06-23 RX ADMIN — ASPIRIN 81 MG: 81 TABLET, COATED ORAL at 09:33

## 2022-06-23 RX ADMIN — LINEZOLID 600 MG: 600 TABLET, FILM COATED ORAL at 09:37

## 2022-06-23 RX ADMIN — INSULIN GLARGINE 20 UNITS: 100 INJECTION, SOLUTION SUBCUTANEOUS at 20:33

## 2022-06-23 RX ADMIN — PIPERACILLIN AND TAZOBACTAM 3375 MG: 3; .375 INJECTION, POWDER, LYOPHILIZED, FOR SOLUTION INTRAVENOUS at 09:58

## 2022-06-23 RX ADMIN — CLOPIDOGREL BISULFATE 75 MG: 75 TABLET ORAL at 09:36

## 2022-06-23 RX ADMIN — MICONAZOLE NITRATE: 20 POWDER TOPICAL at 18:36

## 2022-06-23 RX ADMIN — MUPIROCIN: 20 OINTMENT TOPICAL at 10:45

## 2022-06-23 RX ADMIN — INSULIN LISPRO 2 UNITS: 100 INJECTION, SOLUTION INTRAVENOUS; SUBCUTANEOUS at 09:29

## 2022-06-23 RX ADMIN — PREGABALIN 150 MG: 75 CAPSULE ORAL at 20:32

## 2022-06-23 RX ADMIN — INSULIN LISPRO 5 UNITS: 100 INJECTION, SOLUTION INTRAVENOUS; SUBCUTANEOUS at 09:30

## 2022-06-23 RX ADMIN — DULOXETINE HYDROCHLORIDE 30 MG: 30 CAPSULE, DELAYED RELEASE ORAL at 20:31

## 2022-06-23 RX ADMIN — Medication 10 ML: at 20:31

## 2022-06-23 RX ADMIN — DIPHENHYDRAMINE HCL 25 MG: 25 TABLET ORAL at 16:25

## 2022-06-23 RX ADMIN — ENOXAPARIN SODIUM 40 MG: 100 INJECTION SUBCUTANEOUS at 09:42

## 2022-06-23 RX ADMIN — INSULIN LISPRO 2 UNITS: 100 INJECTION, SOLUTION INTRAVENOUS; SUBCUTANEOUS at 13:01

## 2022-06-23 RX ADMIN — OXYCODONE AND ACETAMINOPHEN 1 TABLET: 5; 325 TABLET ORAL at 15:02

## 2022-06-23 ASSESSMENT — ENCOUNTER SYMPTOMS
SHORTNESS OF BREATH: 0
SORE THROAT: 0
CONSTIPATION: 0
EYE REDNESS: 0
WHEEZING: 0
DIARRHEA: 0
TROUBLE SWALLOWING: 0
COUGH: 0
EYE DISCHARGE: 0
NAUSEA: 0
ABDOMINAL PAIN: 0
BACK PAIN: 0
RHINORRHEA: 0

## 2022-06-23 ASSESSMENT — PAIN SCALES - GENERAL
PAINLEVEL_OUTOF10: 7
PAINLEVEL_OUTOF10: 9
PAINLEVEL_OUTOF10: 7
PAINLEVEL_OUTOF10: 10
PAINLEVEL_OUTOF10: 9

## 2022-06-23 ASSESSMENT — PAIN DESCRIPTION - ORIENTATION
ORIENTATION: RIGHT;LEFT
ORIENTATION: MID
ORIENTATION: RIGHT;LEFT
ORIENTATION: RIGHT;LEFT

## 2022-06-23 ASSESSMENT — PAIN DESCRIPTION - DESCRIPTORS
DESCRIPTORS: SHARP

## 2022-06-23 ASSESSMENT — PAIN DESCRIPTION - LOCATION
LOCATION: NECK;BACK
LOCATION: NECK;BACK
LOCATION: CHEST
LOCATION: CHEST;BACK
LOCATION: CHEST

## 2022-06-23 ASSESSMENT — PAIN DESCRIPTION - PAIN TYPE: TYPE: ACUTE PAIN

## 2022-06-23 NOTE — PROGRESS NOTES
Shift assessment completed. Patient is awake, alert and oriented. Patient does not appear to be in distress, resting comfortably at this time. Pt is currently resting in bed, semi-fowlers, watching television. Call light within reach. No further needs expressed.

## 2022-06-23 NOTE — DISCHARGE INSTR - COC
Continuity of Care Form    Patient Name: Criss Whitt   :  1960  MRN:  [id number]    Admit date:  2022  Discharge date:  ***    Code Status Order: Full Code   Advance Directives:      Admitting Physician:  Radha Ruiz MD  PCP: No primary care provider on file. Discharging Nurse: [facility] Unit/Room#: 9KC-8980/7849-63  Discharging Unit Phone Number: ***    Emergency Contact:   Extended Emergency Contact Information  Primary Emergency Contact: ray diaz  Home Phone: [phone]  Relation: Spouse  Preferred language: English   needed?  No    Past Surgical History:  Past Surgical History:   Procedure Laterality Date    BACK INJECTION      CORONARY ARTERY BYPASS GRAFT      OTHER SURGICAL HISTORY      right leg vascular stents    SIGMOIDOSCOPY N/A 2021    SIGMOIDOSCOPY DIAGNOSTIC FLEXIBLE performed by Jackie Flores MD at [address] [email or web address] ENDOSCOPY    TOE AMPUTATION Right     UPPER GASTROINTESTINAL ENDOSCOPY N/A 2021    EGD BIOPSY performed by Jackie Flores MD at [address] [facility] ENDOSCOPY       Immunization History:   Immunization History   Administered Date(s) Administered    COVID-19, Ebonie Dues, Primary or Immunocompromised, PF, 100mcg/0.5mL 2021, 2021       Active Problems:  Patient Active Problem List   Diagnosis Code    Right hallux infection L08.9    Open wound of right great toe S91.101A    Frequent falls R29.6    Closed fracture of multiple ribs of right side with routine healing S22.41XD    History of left above knee amputation ([facility] [address] Q76.477    Type 2 diabetes mellitus with right diabetic foot infection (HCC) E11.628, L08.9    Urinary tract infection without hematuria N39.0    Chronic indwelling Naylor catheter Z97.8    Pressure injury of skin of sacral region L89.159    Diabetic polyneuropathy associated with type 2 diabetes mellitus (HCC) E11.42    Essential hypertension I10    Coronary artery disease due to lipid rich plaque I25.10, I25.83 Gastroesophageal reflux disease without esophagitis K21.9    History of cholecystectomy Z90.49       Isolation/Infection:   Isolation            Contact          Patient Infection Status       Infection Onset Added Last Indicated Last Indicated By Review Planned Expiration Resolved Resolved By    MRSA 06/20/22 06/21/22 06/21/22 Culture, Wound        ESBL (Extended Spectrum Beta Lactamase) 12/13/21 06/20/22 06/20/22 Wally Diop, RN        Added from external infection from [facility]    Resolved    COVID-19 (Rule Out) 04/19/21 04/19/21 04/19/21 COVID-19, Rapid (Ordered)   04/19/21 Rule-Out Test Resulted            Nurse Assessment:  Last Vital Signs: /70   Pulse 71   Temp 96.8 °F (36 °C) (Temporal)   Resp 16   Ht 5' 8\" (1.727 m)   Wt 165 lb 12.8 oz (75.2 kg)   SpO2 99%   BMI 25.21 kg/m²     Last documented pain score (0-10 scale): Pain Level: 9  Last Weight:   Wt Readings from Last 1 Encounters:   06/19/22 165 lb 12.8 oz (75.2 kg)     Mental Status:  {IP PT MENTAL STATUS:20030}    IV Access:  { DANIELLE IV ACCESS:825367452}    Nursing Mobility/ADLs:  Walking   {[facility] DME VZJP:172878821}  Transfer  {[facility] DME KVNH:664015852}  Bathing  {[facility] DME WYIR:510762249}  Dressing  {[facility] DME CBSQ:987570720}  Toileting  {[facility] DME FBXA:137593447}  Feeding  {[facility] DME NHMX:544518053}  Med Admin  {[facility] DME DYAY:499061845}  Med Delivery   { DANIELLE MED Delivery:448576742}    Wound Care Documentation and Therapy:  Wound 06/19/22 Coccyx Medial Linear (Active)   Wound Etiology Pressure Stage 2 06/22/22 2111   Dressing Status Clean;Dry; Intact 06/23/22 1125   Wound Cleansed Not Cleansed 06/22/22 2111   Dressing/Treatment Foam 06/22/22 2111   Dressing Change Due 06/24/22 06/22/22 2111   Wound Assessment Denuded;Pink/red 06/22/22 0953   Drainage Amount Other (Comment) 06/22/22 2111   Drainage Description Other (Comment) 06/22/22 2111   Odor None 06/22/22 2111   Abbie-wound Assessment Other (Comment) 06/22/22 2111   Number of days: 4       Wound 06/19/22 Pretibial Distal;Right; Outer (Active)   Wound Etiology Skin Tear 06/22/22 2111   Dressing Status Dry 06/22/22 2111   Dressing/Treatment Barrier film 06/22/22 2111   Wound Assessment Other (Comment) 06/22/22 2111   Drainage Amount None 06/22/22 2111   Drainage Description Other (Comment) 06/22/22 2111   Odor None 06/22/22 2111   Number of days: 4       Wound 06/19/22 Toe (Comment  which one) Anterior;Right (Active)   Wound Etiology Diabetic Castillo 2 06/22/22 2111   Dressing Status Clean;Dry; Intact 06/22/22 2111   Wound Cleansed Not Cleansed 06/22/22 2111   Dressing/Treatment Gauze dressing/dressing sponge 06/22/22 2111   Dressing Change Due 06/23/22 06/22/22 2111   Wound Assessment Other (Comment) 06/22/22 2111   Drainage Amount None 06/22/22 2111   Drainage Description Other (Comment) 06/22/22 2111   Odor None 06/22/22 2111   Abbie-wound Assessment Other (Comment) 06/22/22 2111   Number of days: 4       Wound 06/19/22 Toe (Comment  which one) Anterior;Right (Active)   Wound Etiology Diabetic Castillo 2 06/22/22 2111   Dressing Status Clean;Dry; Intact 06/22/22 2111   Wound Cleansed Not Cleansed 06/22/22 2111   Dressing/Treatment Gauze dressing/dressing sponge 06/22/22 2111   Dressing Change Due 06/23/22 06/22/22 2111   Wound Assessment Other (Comment) 06/22/22 2111   Drainage Amount None 06/22/22 2111   Drainage Description Other (Comment) 06/22/22 2111   Odor None 06/22/22 2111   Abbie-wound Assessment Other (Comment) 06/22/22 2111   Number of days: 4       Wound 06/19/22 Toe (Comment  which one) Anterior;Right (Active)   Wound Etiology Pressure Unstageable 06/22/22 1035   Dressing Status Intact;Dry;Clean 06/22/22 2111   Wound Cleansed Not Cleansed 06/22/22 2111   Dressing/Treatment Gauze dressing/dressing sponge 06/22/22 2111   Wound Assessment Other (Comment) 06/22/22 2111   Drainage Amount None 06/22/22 2111   Drainage Description Other (Comment) 06/22/22 2111   Odor None 06/22/22 2111 Abbie-wound Assessment Other (Comment) 22 2111   Number of days: 4        Elimination:  Continence: Bowel: {YES / LR:90374}  Bladder: {YES / YW:89501}  Urinary Catheter: {Urinary Catheter:271030372}   Colostomy/Ileostomy/Ileal Conduit: {YES / DT:87688}       Date of Last BM: ***  No intake or output data in the 24 hours ending 22 1203  I/O last 3 completed shifts:   In: 222.3 [IV Piggyback:222.3]  Out: -     Safety Concerns:     [address] [employer] Safety Concerns:738295033}    Impairments/Disabilities:      [address] [employer] Impairments/Disabilities:419265948}    Nutrition Therapy:  Current Nutrition Therapy:   [address] [employer] Diet List:693252182}    Routes of Feeding: {CHP DME Other Feedings:547711824}  Liquids: {Slp liquid thickness:08730}  Daily Fluid Restriction: {CHP DME Yes amt example:534695778}  Last Modified Barium Swallow with Video (Video Swallowing Test): {Done Not Done KVDR:156403462}    Treatments at the Time of Hospital Discharge:   Respiratory Treatments: ***  Oxygen Therapy:  {Therapy; copd oxygen:30322}  Ventilator:    {[facility] Vent BCEL:064554673}    Rehab Therapies: {THERAPEUTIC INTERVENTION:2304527675}  Weight Bearing Status/Restrictions: [employer] Weight Bearin}  Other Medical Equipment (for information only, NOT a DME order):  {EQUIPMENT:214303507}  Other Treatments: ***    Patient's personal belongings (please select all that are sent with patient):  {CHP DME Belongings:442605486}    RN SIGNATURE:  {Esignature:873973042}    CASE MANAGEMENT/SOCIAL WORK SECTION    Inpatient Status Date: 2022    Readmission Risk Assessment Score:  Readmission Risk              Risk of Unplanned Readmission:  13           Discharging to Facility/ Agency   Name:   Address:  Phone:  Fax:    Dialysis Facility (if applicable)   Name:  Address:  Dialysis Schedule:  Phone:  Fax:    / signature: Electronically signed by Jorje Sexton RN on 22 at 12:07 PM EDT    PHYSICIAN SECTION    Prognosis: Fair    Condition at Discharge: Stable    Rehab Potential (if transferring to Rehab):     Recommended Labs or Other Treatments After Discharge: follow up with PCP and cardiology     Physician Certification: I certify the above information and transfer of Dontae Hou  is necessary for the continuing treatment of the diagnosis listed and that he requires [address] for > 30 days.      Update Admission H&P: No change in H&P    PHYSICIAN SIGNATURE:  Electronically signed by BENEDICT Best CNP on 6/23/22 at 1:23 PM EDT

## 2022-06-23 NOTE — PLAN OF CARE
Problem: Pain  Goal: Verbalizes/displays adequate comfort level or baseline comfort level  Outcome: Progressing     Problem: Safety - Adult  Goal: Free from fall injury  Outcome: Progressing     Problem: Chronic Conditions and Co-morbidities  Goal: Patient's chronic conditions and co-morbidity symptoms are monitored and maintained or improved  Outcome: Progressing     Problem: Skin/Tissue Integrity  Goal: Absence of new skin breakdown  Description: 1. Monitor for areas of redness and/or skin breakdown  2. Assess vascular access sites hourly  3. Every 4-6 hours minimum:  Change oxygen saturation probe site  4. Every 4-6 hours:  If on nasal continuous positive airway pressure, respiratory therapy assess nares and determine need for appliance change or resting period.   Outcome: Progressing     Problem: Metabolic/Fluid and Electrolytes - Adult  Goal: Electrolytes maintained within normal limits  Outcome: Progressing

## 2022-06-23 NOTE — PROGRESS NOTES
Infectious Diseases   Progress Note      Admission Date: 6/18/2022  Hospital Day: Hospital Day: 6   Attending: Karla Ch MD  Date of service: 6/23/2022     Chief complaint/ Reason for consult:     · Right diabetic foot infection  · Concern for urinary tract infection  · Longstanding type 2 diabetes mellitus-poorly controlled  · Chronic indwelling Naylor catheter  · History of sacral decubitus ulcer  · Diabetic polyneuropathy type II  · Essential hypertension    Microbiology:        I have reviewed allavailable micro lab data and cultures    · Blood culture (2/2) - collected on 6/18/2022: Negative    · Urine culture  - collected on 6/18/2022: 50,000 cfu per mL MRSA      Antibiotics and immunizations:       Current antibiotics: All antibiotics and their doses were reviewed by me    Recent Abx Admin                   piperacillin-tazobactam (ZOSYN) 3,375 mg in dextrose 5 % 50 mL IVPB extended infusion (mini-bag) (mg) 3,375 mg New Bag 06/23/22 0958     3,375 mg New Bag  0032     3,375 mg New Bag 06/22/22 1604    linezolid (ZYVOX) tablet 600 mg (mg) 600 mg Given 06/23/22 0937     600 mg Given 06/22/22 2103                  Immunization History: All immunization history was reviewed by me today. Immunization History   Administered Date(s) Administered    Emmy MARK, Primary or Immunocompromised, PF, 100mcg/0.5mL 04/13/2021, 05/11/2021       Known drug allergies: All allergies were reviewed and updated    Allergies   Allergen Reactions    Amiodarone     Bactrim [Sulfamethoxazole-Trimethoprim] Hives       Social history:     Social History:  All social andepidemiologic history was reviewed and updated by me today as needed. · Tobacco use:   reports that he has been smoking. He has a 80.00 pack-year smoking history. He has never used smokeless tobacco.  · Alcohol use:   reports previous alcohol use. · Currently lives in: [place] [address]  ·  reports no history of drug use.      COVID VACCINATION AND LAB RESULT RECORDS:     Internal Administration   First Dose COVID-19, Moderna, Primary or Immunocompromised, PF, 100mcg/0.5mL  04/13/2021   Second Dose COVID-19, Wilma Garcia, Primary or Immunocompromised, PF, 100mcg/0.5mL   05/11/2021       Last COVID Lab SARS-CoV-2, NAAT (no units)   Date Value   04/19/2021 Not Detected            Assessment:     The patient is a 63 y.o. old male who  has a past medical history of CAD (coronary artery disease), Diabetes mellitus ([facility] [address] Empyema ([facility] [address] GERD (gastroesophageal reflux disease), Hyperlipidemia, Hypertension, Low back pain, MI (myocardial infarction) ([facility] [address] PVD (peripheral vascular disease) ([facility] [address] Sleep apnea, and TIA (transient ischemic attack). with following problems:    · Right diabetic foot infection-wound culture growing MRSA  · Concern for urinary tract infection  · Longstanding type 2 diabetes mellitus-maintain good glycemic control  · Chronic indwelling Naylor catheter  · History of sacral decubitus ulcer  · Diabetic polyneuropathy type II  · Essential hypertension-BP controlled  · Coronary artery disease s/p CABG  · History of falls at home resulting in multiple rib fractures  · Hyperlipidemia  · Coronary artery disease  · Gastroesophageal reflux disease  · History of cholecystectomy. ·  History of left above-knee amputation  ·       Discussion:      The patient is afebrile. Right foot wound culture is growing MRSA and corynebacterium. Urine culture is also growing MRSA from 6/20/2022. Sensitivities are awaited. X-ray of the right foot had not shown any obvious osteomyelitis    His platelet count is [phone]. Plan:     Diagnostic Workup:      · Continue to follow  fever curve, WBC count and blood cultures. · Continue to monitor blood counts, liver and renal function. Antimicrobials:    · No evidence of gram-negative infection.   We will discontinue IV Zosyn today  · Continue linezolid 600 mg every 12 hour for MRSA coverage  · Contact isolation for MRSA  · Follow-up on formal susceptibility of the MRSA isolate  · We will follow up on the culture results and clinical progress and will make further recommendations accordingly. · Continue close vitals monitoring. · Maintain good glycemic control. · Fall precautions. · Aspiration precautions. · Continue to watch for new fever or diarrhea. · DVT prophylaxis. · Discussed all above with patient and RN. Drug Monitoring:    · Continue monitoring for antibiotic toxicity as follows: CBC, CMP   · Continue to watch for following: new or worsening fever, new hypotension, hives, lip swelling and redness or purulence at vascular access sites. I/v access Management:    · Continue to monitor i.v access sites for erythema, induration, discharge or tenderness. · As always, continue efforts to minimize tubes/lines/drains as clinically appropriate to reduce chances of line associated infections. Patient education and counseling:        · The patient was educated in detail about the side-effects of various antibiotics and things to watch for like new rashes, lip swelling, severe reaction, worsening diarrhea, break through fever etc.  · Discussed patient's condition and what to expect. All of the patient's questions were addressed in a satisfactory manner and patient verbalized understanding all instructions. Level of complexity of visit: High         Thank you for involving me in the care of your patient. I will continue to follow. If you have anyadditional questions, please do not hesitate to contact me. Subjective: Interval history: Interval history was obtained from chart review and patient/ RN. He is afebrile. He is tolerating antibiotics okay. No diarrhea     REVIEW OF SYSTEMS:      Review of Systems   Constitutional: Positive for fatigue. Negative for chills, diaphoresis and fever. HENT: Negative for ear discharge, ear pain, rhinorrhea, sore throat and trouble swallowing. Eyes: Negative for discharge and redness. Respiratory: Negative for cough, shortness of breath and wheezing. Cardiovascular: Negative for chest pain and leg swelling. Gastrointestinal: Negative for abdominal pain, constipation, diarrhea and nausea. Endocrine: Negative for polyuria. Genitourinary: Negative for dysuria, flank pain, frequency, hematuria and urgency. Musculoskeletal: Negative for back pain and myalgias. Skin: Negative for rash. Neurological: Negative for dizziness, seizures and headaches. Hematological: Does not bruise/bleed easily. Psychiatric/Behavioral: Negative for hallucinations and suicidal ideas. All other systems reviewed and are negative. Past Medical History: All past medical history reviewed today. Past Medical History:   Diagnosis Date    CAD (coronary artery disease)     Diabetes mellitus ([facility] [address]     Empyema ([facility] [address]     GERD (gastroesophageal reflux disease)     Hyperlipidemia     Hypertension     Low back pain     MI (myocardial infarction) ([facility] [address]     PVD (peripheral vascular disease) (HCC)     Sleep apnea     TIA (transient ischemic attack)        Past Surgical History: All past surgical history was reviewed today. Past Surgical History:   Procedure Laterality Date    BACK INJECTION      CORONARY ARTERY BYPASS GRAFT      OTHER SURGICAL HISTORY      right leg vascular stents    SIGMOIDOSCOPY N/A 4/19/2021    SIGMOIDOSCOPY DIAGNOSTIC FLEXIBLE performed by Lemuel Dial MD at [address] Right     UPPER GASTROINTESTINAL ENDOSCOPY N/A 4/19/2021    EGD BIOPSY performed by Lemuel Dial MD at [address]       Family History: All family history was reviewed today. History reviewed. No pertinent family history.     Objective:       PHYSICAL EXAM:      Vitals:   Vitals:    06/23/22 0821 06/23/22 0928 06/23/22 0937 06/23/22 1141   BP: (!) 159/88  (!) 159/88 110/70   Pulse: 70  70 71   Resp: 16   16   Temp: (!) 94.5 °F (34.7 °C) 97.5 °F (36.4 °C)  96.8 °F (36 °C)   TempSrc: Oral Oral  Temporal   SpO2: 98%   99%   Weight:       Height:           Physical Exam  Vitals and nursing note reviewed. Constitutional:       Appearance: Normal appearance. He is well-developed. HENT:      Head: Normocephalic and atraumatic. Right Ear: External ear normal.      Left Ear: External ear normal.      Nose: Nose normal. No congestion or rhinorrhea. Mouth/Throat:      Mouth: Mucous membranes are moist.      Pharynx: No oropharyngeal exudate or posterior oropharyngeal erythema. Eyes:      General: No scleral icterus. Right eye: No discharge. Left eye: No discharge. Conjunctiva/sclera: Conjunctivae normal.      Pupils: Pupils are equal, round, and reactive to light. Cardiovascular:      Rate and Rhythm: Normal rate and regular rhythm. Pulses: Normal pulses. Heart sounds: No murmur heard. No friction rub. Pulmonary:      Effort: Pulmonary effort is normal. No respiratory distress. Breath sounds: Normal breath sounds. No stridor. No wheezing, rhonchi or rales. Abdominal:      General: Bowel sounds are normal.      Palpations: Abdomen is soft. Tenderness: There is no abdominal tenderness. There is no right CVA tenderness, left CVA tenderness, guarding or rebound. Musculoskeletal:         General: No swelling or tenderness. Normal range of motion. Cervical back: Normal range of motion and neck supple. No rigidity. No muscular tenderness. Lymphadenopathy:      Cervical: No cervical adenopathy. Skin:     General: Skin is warm and dry. Coloration: Skin is not jaundiced. Findings: No erythema or rash. Comments: Right lower leg wound improving slowly   Neurological:      General: No focal deficit present. Mental Status: He is alert and oriented to person, place, and time. Mental status is at baseline. Motor: No abnormal muscle tone.    Psychiatric: Mood and Affect: Mood normal.         Behavior: Behavior normal.         Thought Content: Thought content normal.           Lines and drains: All vascular access sites are healthy with no local erythema, discharge or tenderness. Intake and output:    I/O last 3 completed shifts: In: 222.3 [IV Piggyback:222.3]  Out: -     Lab Data:   All available labs and old records have been reviewed by me. CBC:  Recent Labs     06/22/22  1015   WBC 8.3   RBC 4.59   HGB 12.5*   HCT 39.0*      MCV 85.0   MCH 27.3   MCHC 32.1   RDW 16.8*        BMP:  Recent Labs     06/22/22  1014      K 4.8      CO2 21   BUN 31*   CREATININE 0.9   CALCIUM 9.1   GLUCOSE 162*        Hepatic Function Panel:   Lab Results   Component Value Date    ALKPHOS 264 06/18/2022    ALT 10 06/18/2022    AST 13 06/18/2022    PROT 7.8 06/18/2022    BILITOT 0.3 06/18/2022    BILIDIR <0.2 06/18/2022    IBILI see below 06/18/2022    LABALBU 3.6 06/22/2022       CPK: No results found for: CKTOTAL  ESR: No results found for: SEDRATE  CRP: No results found for: CRP        Imaging: All pertinent images and reports for the current visit were reviewed by me during this visit. I reviewed the chest x-ray/CT scan/MRI images and independently interpreted the findings and results today. XR FOOT RIGHT (2 VIEWS)   Final Result   No osteomyelitis or acute osseous injury is identified. CT CHEST ABDOMEN PELVIS WO CONTRAST   Final Result   1. Focal rib deformity seen in the right chest adjacent to the anterior   costochondral junction related to nondisplaced rib fractures, involving the   3rd, 4th, 5th and 6th ribs. These may be related to subacute fractures,   though correlate with point tenderness. Of note, no adjacent parenchymal   abnormality in the lungs to suggest pulmonary contusion and acute injury. No   pneumothorax. 2. No other acute process seen within the chest, abdomen or pelvis.    3. Focal apical pleural-parenchymal suspected scarring at the left lung apex. 4. Atherosclerotic disease including coronary artery involvement, with   postoperative change from prior CABG. 5. Cholecystectomy. 6. Bladder wall thickening, which may be related to poor distention or   possibly cystitis. A small amount of intraluminal air may be related to   recent instrumentation. 7. Prostate enlargement. 8. Multilevel degenerative changes are seen throughout the thoracic and   lumbar spine, greatest at the level of L4-L5. CT HEAD WO CONTRAST   Final Result   No acute intracranial abnormality. Patchy white matter low attenuation with intracranial atherosclerotic disease   suggestive of chronic microvascular ischemic change. Medications: All current and past medications were reviewed.      linezolid  600 mg Oral 2 times per day    piperacillin-tazobactam  3,375 mg IntraVENous Q8H    aspirin  81 mg Oral Daily    atorvastatin  80 mg Oral Daily    clopidogrel  75 mg Oral Daily    DULoxetine  30 mg Oral BID    metoprolol succinate  50 mg Oral Daily    pregabalin  150 mg Oral BID    spironolactone  25 mg Oral Daily    sodium chloride flush  5-40 mL IntraVENous 2 times per day    enoxaparin  40 mg SubCUTAneous Daily    losartan  50 mg Oral BID    insulin glargine  20 Units SubCUTAneous Nightly    insulin lispro  0-12 Units SubCUTAneous TID WC    insulin lispro  0-6 Units SubCUTAneous Nightly    insulin lispro  5 Units SubCUTAneous TID WC    mupirocin   Topical Daily    nicotine  1 patch TransDERmal Daily        sodium chloride 25 mL (06/22/22 1601)    dextrose         diphenhydrAMINE, ketorolac, oxyCODONE-acetaminophen, diphenhydrAMINE, sodium chloride flush, sodium chloride, ondansetron **OR** ondansetron, polyethylene glycol, acetaminophen **OR** acetaminophen, dextrose bolus **OR** dextrose bolus, glucagon (rDNA), dextrose      Problem list:       Patient Active Problem List   Diagnosis Code    Right

## 2022-06-23 NOTE — DISCHARGE SUMMARY
[facility]    Patient Demographics    Patient. Jackie Lee  Date of Birth. 1960  MRN. [id number]     Primary care provider. No primary care provider on file. (Tel: None)    Admit date: 6/18/2022    Discharge date  6/24/2022  Note Date: 6/24/2022     Reason for Hospitalization. Chief Complaint   Patient presents with    Fall     Patient in via [address] [facility] for multiple calls for falls this evening. Patient states he has lower back pain as well and side pain. Significant Findings. Principal Problem:    Right hallux infection  Active Problems:    Open wound of right great toe    Frequent falls    Closed fracture of multiple ribs of right side with routine healing    History of left above knee amputation (HCC)    Type 2 diabetes mellitus with right diabetic foot infection ([address]    Urinary tract infection without hematuria    Chronic indwelling Naylor catheter    Pressure injury of skin of sacral region    Diabetic polyneuropathy associated with type 2 diabetes mellitus ([address]    Essential hypertension    Coronary artery disease due to lipid rich plaque    Gastroesophageal reflux disease without esophagitis    History of cholecystectomy  Resolved Problems:    * No resolved hospital problems. *       Problems and results from this hospitalization that need follow up. 1. Follow up with cardiology  2. Work on better BG control    Significant test results and incidental findings.   AIC 8.2 on May 28 2022     No osteomyelitis or acute osseous injury is identified.             CT head:  No acute intracranial abnormality.       Patchy white matter low attenuation with intracranial atherosclerotic disease   suggestive of chronic microvascular ischemic change.          CT abd and pelvis     . Focal rib deformity seen in the right chest adjacent to the anterior   costochondral junction related to nondisplaced rib fractures, involving the   3rd, 4th, 5th and 6th ribs.  These may be related to subacute fractures,   though correlate with point tenderness.  Of note, no adjacent parenchymal   abnormality in the lungs to suggest pulmonary contusion and acute injury.  No   pneumothorax. 2. No other acute process seen within the chest, abdomen or pelvis. 3. Focal apical pleural-parenchymal suspected scarring at the left lung apex. 4. Atherosclerotic disease including coronary artery involvement, with   postoperative change from prior CABG. 5. Cholecystectomy. 6. Bladder wall thickening, which may be related to poor distention or   possibly cystitis.  A small amount of intraluminal air may be related to   recent instrumentation. 7. Prostate enlargement.    8. Multilevel degenerative changes are seen throughout the thoracic and   lumbar spine, greatest at the level of L4-L5.         URINE culture:  Staph aureus mrsa (1)    Antibiotic Interpretation Microscan  Method Status    ceFAZolin Resistant >16 mcg/mL BACTERIAL SUSCEPTIBILITY PANEL BY EDGARD     nitrofurantoin Sensitive <=32 mcg/mL BACTERIAL SUSCEPTIBILITY PANEL BY EDGARD     oxacillin Resistant >2 mcg/mL BACTERIAL SUSCEPTIBILITY PANEL BY EDGARD     tetracycline Sensitive <=4 mcg/mL BACTERIAL SUSCEPTIBILITY PANEL BY EDGARD     trimethoprim-sulfamethoxazole Sensitive <=0.5/9.5 mcg/mL BACTERIAL SUSCEPTIBILITY PANEL BY EDGARD     vancomycin Sensitive 1 mcg/mL BACTERIAL SUSCEPTIBILITY PANEL BY EDGARD       Wound Culture:    Staph aureus mrsa (1)    Antibiotic Interpretation Microscan  Method Status    ceFAZolin Resistant >16 mcg/mL BACTERIAL SUSCEPTIBILITY PANEL BY EDGARD     clindamycin Resistant >4 mcg/mL BACTERIAL SUSCEPTIBILITY PANEL BY EDGARD     erythromycin Resistant >4 mcg/mL BACTERIAL SUSCEPTIBILITY PANEL BY EDGARD     oxacillin Resistant >2 mcg/mL BACTERIAL SUSCEPTIBILITY PANEL BY EDGARD     tetracycline Sensitive <=4 mcg/mL BACTERIAL SUSCEPTIBILITY PANEL BY EDGARD     trimethoprim-sulfamethoxazole Sensitive <=0.5/9.5 mcg/mL BACTERIAL SUSCEPTIBILITY PANEL BY EDGARD     vancomycin Sensitive 2 mcg/mL BACTERIAL SUSCEPTIBILITY PANEL BY EDGARD          Narrative            Invasive procedures and treatments. [facility]. Patient was admitted with weakness, R great toe cellulitis. Wife was pushing him down their wheelchair ramp  when he went to fast and fell forward at bottom of the ramp falling on the ground on his R side. He sustained abrasions and rib fractures. He was recently at [facility] with hyperglycemia but also had abnormal stress test. Cath was not performed-cards elected for maximizing medical therapy.      He is living in a hotel with his wife. He has exhausted all his skilled days due to his recent left AKA  Not interested in transitioning to medicaid, bc then his wife would not have a place to stay. He was admitted and treated for the followin. Right great toe cellulitis: on zosyn and now oral linezolid 600 q 12 hours during  His stay. BC with no current growth. No OM per xray. 2. Multiple rib fractures: IS added . Percocet added on   3. UTI with MRSA: on linezolid  4. T2DM:  BG values reviewed . Continue with lantus 20 units at hs, 5 units prandial with correction as needed   5. CAD with recent abnormal stress test:  Follows at [facility] with Dr Brayden Arellano, see note above . Pt has had previous CABG  6. Pt currently living in a hotel with his wife. She anticipates moving to a hotel in the [address] area to be closer to his cardiologist etc.   7. He was feeling better at the time of d/c and was comfortable with d/c         Consults. IP CONSULT TO SOCIAL WORK  IP CONSULT TO PHARMACY  IP CONSULT TO PODIATRY  IP CONSULT TO INFECTIOUS DISEASES  IP CONSULT TO FINANCIAL COUNSELOR    Physical examination on discharge day.    /70   Pulse 71   Temp 96.8 °F (36 °C) (Temporal)   Resp 16   Ht 5' 8\" (1.727 m)   Wt 165 lb 12.8 oz (75.2 kg)   SpO2 99%   BMI 25.21 kg/m²   General appearance. Alert. Looks chronically ill and frail   HEENT. Sclera clear. Moist mucus membranes. Cardiovascular. Regular rate and rhythm, normal S1, S2. No murmur. Respiratory. Not using accessory muscles. Clear to auscultation bilaterally, no wheeze. Gastrointestinal. Abdomen soft, non-tender, not distended, normal bowel sounds  Neurology. Facial symmetry. No speech deficits. Moving all extremities equally. Extremities. No edema in lower extremities. Left stump is unremarkable. Few toes on right foot with old scabs noted. No current drainage   Skin. Warm, dry, normal turgor    Condition at time of discharge stable    Medication instructions provided to patient at discharge. Medication List      START taking these medications    doxycycline hyclate 100 MG tablet  Commonly known as: VIBRA-TABS  Take 1 tablet by mouth every 12 hours     linezolid 600 MG tablet  Commonly known as: ZYVOX  Take 1 tablet by mouth every 12 hours for 10 days     miconazole 2 % powder  Commonly known as: MICOTIN  Apply topically 2 times daily. mupirocin 2 % ointment  Commonly known as: BACTROBAN  Apply topically 3 times daily. CHANGE how you take these medications    oxyCODONE-acetaminophen 5-325 MG per tablet  Commonly known as: PERCOCET  Take 1 tablet by mouth every 6 hours as needed for Pain for up to 3 days.   What changed: when to take this        CONTINUE taking these medications    acetaminophen 500 MG tablet  Commonly known as: TYLENOL     aspirin 81 MG EC tablet     atorvastatin 80 MG tablet  Commonly known as: LIPITOR     BENADRYL PO     Cialis 5 MG tablet  Generic drug: tadalafil     clopidogrel 75 MG tablet  Commonly known as: PLAVIX     DULoxetine 30 MG extended release capsule  Commonly known as: CYMBALTA     insulin glargine 100 UNIT/ML injection vial  Commonly known as: LANTUS     insulin lispro 100 UNIT/ML injection vial  Commonly known as: HUMALOG     losartan 50 MG tablet  Commonly known as: COZAAR     methocarbamol 500 MG tablet  Commonly known as: ROBAXIN     metoprolol succinate 50 MG extended release tablet  Commonly known as: TOPROL XL     omeprazole 20 MG delayed release capsule  Commonly known as: PRILOSEC     pregabalin 150 MG capsule  Commonly known as: LYRICA     spironolactone 25 MG tablet  Commonly known as: ALDACTONE           Where to Get Your Medications      These medications were sent to [facility], [address] [place] [address], [address] [place] [address] Road    Phone: [phone]   · doxycycline hyclate 100 MG tablet  · linezolid 600 MG tablet  · mupirocin 2 % ointment     You can get these medications from any pharmacy    Bring a paper prescription for each of these medications  · miconazole 2 % powder  · oxyCODONE-acetaminophen 5-325 MG per tablet         Discharge recommendations given to patient. Follow Up. in 1 week   Disposition. Home   Activity. As tolerated   Diet: ADULT DIET; Regular; 5 carb choices (75 gm/meal)      Spent 35 minutes in discharge process.     Signed:  BENEDICT Gross CNP     6/24/2022 8:10 AM

## 2022-06-23 NOTE — PROGRESS NOTES
[facility] PROGRESS NOTE    6/23/2022 8:11 AM        Name: Sharyle King . Admitted: 6/18/2022  Primary Care Provider: No primary care provider on file. (Tel: None)          Subjective:    Patient was admitted with weakness, R great toe cellulitis. Wife was pushing him down their wheelchair ramp  when he went to fast and fell forward at bottom of the ramp falling on the ground on his R side. He was recently at [facility] with hyperglycemia but also has abnormal stress test. Cath was not performed-cards elected for maximizing medical therapy. He is living in a hotel with his wife. He has exhausted all his skilled days due to his recent left AKA  Not interested in transitioning to medicaid, bc then his wife would not have a place to stay. Seen this am   We discussed probable d/c today   He is agreeable.     Pain meds are helping the rib fractures         Reviewed interval ancillary notes    Current Medications  diphenhydrAMINE (BENADRYL) tablet 25 mg, Q6H PRN  ketorolac (TORADOL) injection 15 mg, Q6H PRN  oxyCODONE-acetaminophen (PERCOCET) 5-325 MG per tablet 1 tablet, Q4H PRN  linezolid (ZYVOX) tablet 600 mg, 2 times per day  diphenhydrAMINE (BENADRYL) tablet 25 mg, Nightly PRN  piperacillin-tazobactam (ZOSYN) 3,375 mg in dextrose 5 % 50 mL IVPB extended infusion (mini-bag), Q8H  aspirin EC tablet 81 mg, Daily  atorvastatin (LIPITOR) tablet 80 mg, Daily  clopidogrel (PLAVIX) tablet 75 mg, Daily  DULoxetine (CYMBALTA) extended release capsule 30 mg, BID  metoprolol succinate (TOPROL XL) extended release tablet 50 mg, Daily  pregabalin (LYRICA) capsule 150 mg, BID  spironolactone (ALDACTONE) tablet 25 mg, Daily  sodium chloride flush 0.9 % injection 5-40 mL, 2 times per day  sodium chloride flush 0.9 % injection 5-40 mL, PRN  0.9 % sodium chloride infusion, PRN  enoxaparin (LOVENOX) injection 40 mg, Daily  ondansetron (ZOFRAN-ODT) disintegrating tablet 4 mg, Q8H PRN   Or  ondansetron (ZOFRAN) injection 4 mg, Q6H PRN  polyethylene glycol (GLYCOLAX) packet 17 g, Daily PRN  acetaminophen (TYLENOL) tablet 650 mg, Q6H PRN   Or  acetaminophen (TYLENOL) suppository 650 mg, Q6H PRN  dextrose bolus 10% 125 mL, PRN   Or  dextrose bolus 10% 250 mL, PRN  glucagon (rDNA) injection 1 mg, PRN  dextrose 5 % solution, PRN  losartan (COZAAR) tablet 50 mg, BID  insulin glargine (LANTUS) injection vial 20 Units, Nightly  insulin lispro (HUMALOG) injection vial 0-12 Units, TID WC  insulin lispro (HUMALOG) injection vial 0-6 Units, Nightly  insulin lispro (HUMALOG) injection vial 5 Units, TID WC  mupirocin (BACTROBAN) 2 % ointment, Daily  nicotine (NICODERM CQ) 21 MG/24HR 1 patch, Daily        Objective:  BP (!) 163/92   Pulse 72   Temp 97.4 °F (36.3 °C) (Oral)   Resp 16   Ht 5' 8\" (1.727 m)   Wt 165 lb 12.8 oz (75.2 kg)   SpO2 96%   BMI 25.21 kg/m²   No intake or output data in the 24 hours ending 06/23/22 0811   Wt Readings from Last 3 Encounters:   06/19/22 165 lb 12.8 oz (75.2 kg)       General appearance:  Appears chronically ill and frail . Eyes: Sclera clear. Pupils equal.  ENT: Moist oral mucosa. Trachea midline, no adenopathy. Cardiovascular: Regular rhythm, normal S1, S2. No murmur. No edema in lower extremities  Respiratory: Not using accessory muscles. Good inspiratory effort. Clear to auscultation bilaterally, no wheeze or crackles. GI: Abdomen soft, no tenderness, not distended, normal bowel sounds  Musculoskeletal: No cyanosis in digits, neck supple  Neurology: CN 2-12 grossly intact. No speech or motor deficits  Psych: Normal affect. Alert and oriented in time, place and person  Skin: Warm, dry, poor turgor. Left stump is well healed. Right foot dressing removed. Several toes absent. Remaining toes with dried ulcerations with no current drainage.       Labs and Tests:  CBC:   Recent Labs greatest at the level of L4-L5.             Results for Canelo Myers (MRN [id number]) as of 6/23/2022 08:12   Ref. Range 6/22/2022 17:29 6/22/2022 20:57 6/23/2022 07:41   POC Glucose Latest Ref Range: 70 - 99 mg/dl 130 (H) 161 (H) 148 (H)           Problem List  Principal Problem:    Right hallux infection  Active Problems:    Open wound of right great toe    Frequent falls    Closed fracture of multiple ribs of right side with routine healing    History of left above knee amputation (HCC)    Type 2 diabetes mellitus with right diabetic foot infection (HCC)    Urinary tract infection without hematuria    Chronic indwelling Naylor catheter    Pressure injury of skin of sacral region    Diabetic polyneuropathy associated with type 2 diabetes mellitus ([address]    Essential hypertension    Coronary artery disease due to lipid rich plaque    Gastroesophageal reflux disease without esophagitis    History of cholecystectomy  Resolved Problems:    * No resolved hospital problems. *       Assessment & Plan:   1. Right great toe cellulitis: on zosyn and now oral linezolid 600 q 12 hours ( MRSA in urine)  BC with no current growth. No OM per xray. ID is following  2. Multiple rib fractures: IS added . Percocet added on 6/21, will also add toradol . Pain is improving  3. UTI with MRSA: on linezolid  4. T2DM:  BG values reviewed . Continue with lantus 20 units at hs, 5 units prandial with correction as needed   5. CAD with recent abnormal stress test:  Follows at [facility] with Dr Meg Gimenez, see note above . Pt has had previous CABG  6. Pt currently living in a hotel with his wife. She anticipates moving to a hotel in the [address] [facility] [place] area to be closer to his cardiologist etc.    7. Appreciate input from Sherry. Awaiting sensitivities for d/c . Gabrielarich Baileye . likely wont happen until Friday         Diet: ADULT DIET;  Regular; 5 carb choices (75 gm/meal)  Code:Full Code  DVT PPX      BENEDICT Santos - CNP   6/23/2022 8:11 AM

## 2022-06-23 NOTE — CARE COORDINATION
Spoke to spouse [facility] and she states that the patient should return to the Extended [facility] room 107 @ [address] [address], as that is where they are currently residing.

## 2022-06-24 VITALS
HEIGHT: 68 IN | HEART RATE: 79 BPM | RESPIRATION RATE: 16 BRPM | DIASTOLIC BLOOD PRESSURE: 78 MMHG | BODY MASS INDEX: 25.13 KG/M2 | OXYGEN SATURATION: 96 % | TEMPERATURE: 97.4 F | WEIGHT: 165.8 LBS | SYSTOLIC BLOOD PRESSURE: 142 MMHG

## 2022-06-24 LAB
GLUCOSE BLD-MCNC: 114 MG/DL (ref 70–99)
GLUCOSE BLD-MCNC: 93 MG/DL (ref 70–99)
GRAM STAIN RESULT: ABNORMAL
ORGANISM: ABNORMAL
ORGANISM: ABNORMAL
PERFORMED ON: ABNORMAL
PERFORMED ON: NORMAL
WOUND/ABSCESS: ABNORMAL
WOUND/ABSCESS: ABNORMAL

## 2022-06-24 PROCEDURE — 6370000000 HC RX 637 (ALT 250 FOR IP): Performed by: PHYSICIAN ASSISTANT

## 2022-06-24 PROCEDURE — 2580000003 HC RX 258: Performed by: INTERNAL MEDICINE

## 2022-06-24 PROCEDURE — 6370000000 HC RX 637 (ALT 250 FOR IP): Performed by: HOSPITALIST

## 2022-06-24 PROCEDURE — 6360000002 HC RX W HCPCS: Performed by: INTERNAL MEDICINE

## 2022-06-24 PROCEDURE — 6360000002 HC RX W HCPCS: Performed by: NURSE PRACTITIONER

## 2022-06-24 PROCEDURE — 6370000000 HC RX 637 (ALT 250 FOR IP): Performed by: INTERNAL MEDICINE

## 2022-06-24 PROCEDURE — 99232 SBSQ HOSP IP/OBS MODERATE 35: CPT | Performed by: INTERNAL MEDICINE

## 2022-06-24 PROCEDURE — 6370000000 HC RX 637 (ALT 250 FOR IP): Performed by: NURSE PRACTITIONER

## 2022-06-24 RX ORDER — DOXYCYCLINE HYCLATE 100 MG
100 TABLET ORAL EVERY 12 HOURS SCHEDULED
Qty: 60 TABLET | Refills: 0 | Status: SHIPPED | OUTPATIENT
Start: 2022-06-24 | End: 2022-07-24

## 2022-06-24 RX ORDER — LINEZOLID 600 MG/1
600 TABLET, FILM COATED ORAL EVERY 12 HOURS SCHEDULED
Qty: 8 TABLET | Refills: 0 | Status: SHIPPED | OUTPATIENT
Start: 2022-06-24 | End: 2022-06-24

## 2022-06-24 RX ORDER — LINEZOLID 600 MG/1
600 TABLET, FILM COATED ORAL EVERY 12 HOURS SCHEDULED
Qty: 20 TABLET | Refills: 0 | Status: SHIPPED | OUTPATIENT
Start: 2022-06-24 | End: 2022-07-04

## 2022-06-24 RX ORDER — DOXYCYCLINE HYCLATE 100 MG
100 TABLET ORAL EVERY 12 HOURS SCHEDULED
Status: DISCONTINUED | OUTPATIENT
Start: 2022-06-24 | End: 2022-06-24 | Stop reason: HOSPADM

## 2022-06-24 RX ADMIN — KETOROLAC TROMETHAMINE 15 MG: 30 INJECTION, SOLUTION INTRAMUSCULAR at 02:00

## 2022-06-24 RX ADMIN — ENOXAPARIN SODIUM 40 MG: 100 INJECTION SUBCUTANEOUS at 10:23

## 2022-06-24 RX ADMIN — OXYCODONE AND ACETAMINOPHEN 1 TABLET: 5; 325 TABLET ORAL at 00:44

## 2022-06-24 RX ADMIN — LINEZOLID 600 MG: 600 TABLET, FILM COATED ORAL at 10:24

## 2022-06-24 RX ADMIN — ASPIRIN 81 MG: 81 TABLET, COATED ORAL at 10:24

## 2022-06-24 RX ADMIN — ATORVASTATIN CALCIUM 80 MG: 80 TABLET, FILM COATED ORAL at 10:25

## 2022-06-24 RX ADMIN — DULOXETINE HYDROCHLORIDE 30 MG: 30 CAPSULE, DELAYED RELEASE ORAL at 10:24

## 2022-06-24 RX ADMIN — INSULIN LISPRO 5 UNITS: 100 INJECTION, SOLUTION INTRAVENOUS; SUBCUTANEOUS at 13:46

## 2022-06-24 RX ADMIN — SPIRONOLACTONE 25 MG: 25 TABLET ORAL at 10:24

## 2022-06-24 RX ADMIN — Medication 10 ML: at 10:27

## 2022-06-24 RX ADMIN — MICONAZOLE NITRATE: 20 POWDER TOPICAL at 10:26

## 2022-06-24 RX ADMIN — PREGABALIN 150 MG: 75 CAPSULE ORAL at 10:25

## 2022-06-24 RX ADMIN — LOSARTAN POTASSIUM 50 MG: 25 TABLET, FILM COATED ORAL at 10:24

## 2022-06-24 RX ADMIN — METOPROLOL SUCCINATE 50 MG: 50 TABLET, EXTENDED RELEASE ORAL at 10:25

## 2022-06-24 RX ADMIN — DIPHENHYDRAMINE HCL 25 MG: 25 TABLET ORAL at 02:00

## 2022-06-24 RX ADMIN — INSULIN LISPRO 5 UNITS: 100 INJECTION, SOLUTION INTRAVENOUS; SUBCUTANEOUS at 10:27

## 2022-06-24 RX ADMIN — CLOPIDOGREL BISULFATE 75 MG: 75 TABLET ORAL at 10:24

## 2022-06-24 ASSESSMENT — PAIN DESCRIPTION - PAIN TYPE
TYPE: ACUTE PAIN

## 2022-06-24 ASSESSMENT — PAIN DESCRIPTION - DESCRIPTORS
DESCRIPTORS: SHARP

## 2022-06-24 ASSESSMENT — PAIN - FUNCTIONAL ASSESSMENT
PAIN_FUNCTIONAL_ASSESSMENT: PREVENTS OR INTERFERES SOME ACTIVE ACTIVITIES AND ADLS
PAIN_FUNCTIONAL_ASSESSMENT: PREVENTS OR INTERFERES SOME ACTIVE ACTIVITIES AND ADLS

## 2022-06-24 ASSESSMENT — PAIN SCALES - GENERAL
PAINLEVEL_OUTOF10: 9
PAINLEVEL_OUTOF10: 9
PAINLEVEL_OUTOF10: 0
PAINLEVEL_OUTOF10: 9
PAINLEVEL_OUTOF10: 7

## 2022-06-24 ASSESSMENT — ENCOUNTER SYMPTOMS
WHEEZING: 0
DIARRHEA: 0
TROUBLE SWALLOWING: 0
ABDOMINAL PAIN: 0
SHORTNESS OF BREATH: 0
EYE REDNESS: 0
BACK PAIN: 0
EYE DISCHARGE: 0
SORE THROAT: 0
CONSTIPATION: 0
RHINORRHEA: 0
COUGH: 0
NAUSEA: 0

## 2022-06-24 ASSESSMENT — PAIN DESCRIPTION - ORIENTATION
ORIENTATION: MID;RIGHT
ORIENTATION: RIGHT;MID

## 2022-06-24 ASSESSMENT — PAIN DESCRIPTION - ONSET
ONSET: ON-GOING
ONSET: ON-GOING

## 2022-06-24 ASSESSMENT — PAIN DESCRIPTION - FREQUENCY
FREQUENCY: CONTINUOUS
FREQUENCY: CONTINUOUS

## 2022-06-24 ASSESSMENT — PAIN DESCRIPTION - LOCATION
LOCATION: CHEST;RIB CAGE
LOCATION: CHEST
LOCATION: CHEST;RIB CAGE
LOCATION: CHEST;RIB CAGE

## 2022-06-24 NOTE — PROGRESS NOTES
Shift assessment complete. VSS. Scheduled medications given. Percocet and benadryl given PRN, see MAR. Snack given. Denies further needs. Call light within reach.

## 2022-06-24 NOTE — PROGRESS NOTES
Infectious Diseases   Progress Note      Admission Date: 6/18/2022  Hospital Day: Hospital Day: 7   Attending: Nikita Lambert MD  Date of service: 6/24/2022     Chief complaint/ Reason for consult:     · Right diabetic foot infection  · Concern for urinary tract infection  · Longstanding type 2 diabetes mellitus-poorly controlled  · Chronic indwelling Naylor catheter  · History of sacral decubitus ulcer  · Diabetic polyneuropathy type II  · Essential hypertension    Microbiology:        I have reviewed allavailable micro lab data and cultures    · Blood culture (2/2) - collected on 6/18/2022: Negative    · Urine culture  - collected on 6/18/2022: 50,000 cfu per mL MRSA  · Right leg wound culture: Collected on 6/21/2022: MRSA    Susceptibility      Staph aureus mrsa (1)    Antibiotic Interpretation Microscan  Method Status    ceFAZolin Resistant >16 mcg/mL BACTERIAL SUSCEPTIBILITY PANEL BY EDGARD     clindamycin Resistant >4 mcg/mL BACTERIAL SUSCEPTIBILITY PANEL BY EDGARD     erythromycin Resistant >4 mcg/mL BACTERIAL SUSCEPTIBILITY PANEL BY EDGARD     oxacillin Resistant >2 mcg/mL BACTERIAL SUSCEPTIBILITY PANEL BY EDGARD     tetracycline Sensitive <=4 mcg/mL BACTERIAL SUSCEPTIBILITY PANEL BY EDGARD     trimethoprim-sulfamethoxazole Sensitive <=0.5/9.5 mcg/mL BACTERIAL SUSCEPTIBILITY PANEL BY EDGARD     vancomycin Sensitive 2 mcg/mL BACTERIAL SUSCEPTIBILITY PANEL BY EDGARD         Antibiotics and immunizations:       Current antibiotics: All antibiotics and their doses were reviewed by me    Recent Abx Admin                   linezolid (ZYVOX) tablet 600 mg (mg) 600 mg Given 06/24/22 1024     600 mg Given 06/23/22 2031                  Immunization History: All immunization history was reviewed by me today. Immunization History   Administered Date(s) Administered    SULEMAID-19, Ann Miramontes, Primary or Immunocompromised, PF, 100mcg/0.5mL 04/13/2021, 05/11/2021       Known drug allergies:      All allergies were reviewed and updated    Allergies   Allergen Reactions    Amiodarone     Bactrim [Sulfamethoxazole-Trimethoprim] Hives       Social history:     Social History:  All social andepidemiologic history was reviewed and updated by me today as needed. · Tobacco use:   reports that he has been smoking. He has a 80.00 pack-year smoking history. He has never used smokeless tobacco.  · Alcohol use:   reports previous alcohol use. · Currently lives in: [place] [id number]  ·  reports no history of drug use. COVID VACCINATION AND LAB RESULT RECORDS:     Internal Administration   First Dose COVID-19, Moderna, Primary or Immunocompromised, PF, 100mcg/0.5mL  04/13/2021   Second Dose COVID-19, Excell Jimmie, Primary or Immunocompromised, PF, 100mcg/0.5mL   05/11/2021       Last COVID Lab SARS-CoV-2, NAAT (no units)   Date Value   04/19/2021 Not Detected            Assessment:     The patient is a 63 y.o. old male who  has a past medical history of CAD (coronary artery disease), Diabetes mellitus ([address] Empyema ([address] GERD (gastroesophageal reflux disease), Hyperlipidemia, Hypertension, Low back pain, MI (myocardial infarction) ([address] PVD (peripheral vascular disease) ([address] Sleep apnea, and TIA (transient ischemic attack). with following problems:    · Right diabetic foot infection-wound culture growing MRSA-covered with linezolid  · Concern for urinary tract infection  · Longstanding type 2 diabetes mellitus-maintain good glucose control  · Chronic indwelling Naylor catheter  · History of sacral decubitus ulcer  · Diabetic polyneuropathy type II  · Essential hypertension-blood pressure okay  · Coronary artery disease s/p CABG  · History of falls at home resulting in multiple rib fractures  · Hyperlipidemia  · Coronary artery disease  · Gastroesophageal reflux disease-stable  · History of cholecystectomy. ·  History of left above-knee amputation  ·       Discussion:      The patient is afebrile.   He is on linezolid for MRSA coverage. Right leg wound culture from 6/21/2022 had come back positive for MRSA    Linezolid was also tetracycline susceptible. Plan:     Diagnostic Workup:    · Continue to follow  fever curve, WBC count and blood cultures. · Continue to monitor blood counts, liver and renal function. Antimicrobials:    · Will continue oral linezolid 600 mg every 12 hours  · Will order p.o. doxycycline 100 mg every 12 hour for additional MRSA coverage. Doxycycline also has better urine bioavailability  · Plan for 10-day course of linezolid at discharge  · Plan for 1 month course of oral doxycycline for chronic right leg wound  · Discussed the importance of antibiotic compliance  · Contact isolation for MRSA  · I have reconciled his discharge antibiotics  · Continue close vitals monitoring. · Maintain good glycemic control. · Fall precautions. · Aspiration precautions. · Continue to watch for new fever or diarrhea. · DVT prophylaxis. · Discussed all above with patient and RN. · Discussed with hospitalist      Drug Monitoring:    · Continue monitoring for antibiotic toxicity as follows: CBC, CMP   · Continue to watch for following: new or worsening fever, new hypotension, hives, lip swelling and redness or purulence at vascular access sites. I/v access Management:    · Continue to monitor i.v access sites for erythema, induration, discharge or tenderness. · As always, continue efforts to minimize tubes/lines/drains as clinically appropriate to reduce chances of line associated infections. Patient education and counseling:       · The patient was educated in detail about the side-effects of various antibiotics and things to watch for like new rashes, lip swelling, severe reaction, worsening diarrhea, break through fever etc.  · Discussed patient's condition and what to expect.  All of the patient's questions were addressed in a satisfactory manner and patient verbalized understanding all instructions. Level of complexity of visit: High     Doxycyline/minocycline related instructions: Take Doxycyline/minocycline with a full glass of water and stay upright or sitting up after taking it for 30 minutes as it can cause food pipe irritation (pill esophagitis). Use sunscreen when going in bright sun while on Doxycycline/minocycline as it can cause photosensitivity. Take 1 hour before or 2 hour after dairy, calcium, iron, magnesium, aluminum or zinc.        Thank you for involving me in the care of your patient. I will continue to follow. If you have anyadditional questions, please do not hesitate to contact me. Subjective: Interval history: Interval history was obtained from chart review and patient/ RN. The patient is afebrile. He is tolerating antibiotics okay. No diarrhea     REVIEW OF SYSTEMS:      Review of Systems   Constitutional: Negative for chills, diaphoresis and fever. HENT: Negative for ear discharge, ear pain, rhinorrhea, sore throat and trouble swallowing. Eyes: Negative for discharge and redness. Respiratory: Negative for cough, shortness of breath and wheezing. Cardiovascular: Negative for chest pain and leg swelling. Gastrointestinal: Negative for abdominal pain, constipation, diarrhea and nausea. Endocrine: Negative for polyuria. Genitourinary: Negative for dysuria, flank pain, frequency, hematuria and urgency. Musculoskeletal: Negative for back pain and myalgias. Skin: Negative for rash. Neurological: Negative for dizziness, seizures and headaches. Hematological: Does not bruise/bleed easily. Psychiatric/Behavioral: Negative for hallucinations and suicidal ideas. All other systems reviewed and are negative. Past Medical History: All past medical history reviewed today.     Past Medical History:   Diagnosis Date    CAD (coronary artery disease)     [facility]     Empyema (HCC)     GERD (gastroesophageal reflux disease)     Hyperlipidemia     Hypertension     Low back pain     [facility]     PVD (peripheral vascular disease) [facility]     Sleep apnea     TIA (transient ischemic attack)        Past Surgical History: All past surgical history was reviewed today. Past Surgical History:   Procedure Laterality Date    BACK INJECTION      CORONARY ARTERY BYPASS GRAFT      OTHER SURGICAL HISTORY      right leg vascular stents    SIGMOIDOSCOPY N/A 4/19/2021    SIGMOIDOSCOPY DIAGNOSTIC FLEXIBLE performed by Mckenna Johnson MD at [address] Right     UPPER GASTROINTESTINAL ENDOSCOPY N/A 4/19/2021    EGD BIOPSY performed by Mckenna Johnson MD at [address]       Family History: All family history was reviewed today. History reviewed. No pertinent family history. Objective:       PHYSICAL EXAM:      Vitals:   Vitals:    06/24/22 0114 06/24/22 0350 06/24/22 0830 06/24/22 1153   BP:  (!) 156/79 (!) 158/78 (!) 142/78   Pulse:  70 78 79   Resp: 16 16 16 16   Temp:  97.2 °F (36.2 °C) 97.7 °F (36.5 °C) 97.4 °F (36.3 °C)   TempSrc:  Temporal Oral Oral   SpO2:  96%  96%   Weight:       Height:           Physical Exam  Vitals and nursing note reviewed. Constitutional:       Appearance: Normal appearance. He is well-developed. HENT:      Head: Normocephalic and atraumatic. Right Ear: External ear normal.      Left Ear: External ear normal.      Nose: Nose normal. No congestion or rhinorrhea. Mouth/Throat:      Mouth: Mucous membranes are moist.      Pharynx: No oropharyngeal exudate or posterior oropharyngeal erythema. Eyes:      General: No scleral icterus. Right eye: No discharge. Left eye: No discharge. Conjunctiva/sclera: Conjunctivae normal.      Pupils: Pupils are equal, round, and reactive to light. Cardiovascular:      Rate and Rhythm: Normal rate and regular rhythm. Pulses: Normal pulses.       Heart sounds: No murmur heard.  No friction rub. Pulmonary:      Effort: Pulmonary effort is normal. No respiratory distress. Breath sounds: Normal breath sounds. No stridor. No wheezing, rhonchi or rales. Abdominal:      General: Bowel sounds are normal.      Palpations: Abdomen is soft. Tenderness: There is no abdominal tenderness. There is no right CVA tenderness, left CVA tenderness, guarding or rebound. Musculoskeletal:         General: No swelling or tenderness. Normal range of motion. Cervical back: Normal range of motion and neck supple. No rigidity. No muscular tenderness. Lymphadenopathy:      Cervical: No cervical adenopathy. Skin:     General: Skin is warm and dry. Coloration: Skin is not jaundiced. Findings: No erythema or rash. Comments: Right leg ulceration improving slowly   Neurological:      General: No focal deficit present. Mental Status: He is alert and oriented to person, place, and time. Mental status is at baseline. Motor: No abnormal muscle tone. Psychiatric:         Mood and Affect: Mood normal.         Behavior: Behavior normal.         Thought Content: Thought content normal.           Lines and drains: All vascular access sites are healthy with no local erythema, discharge or tenderness. Intake and output:    I/O last 3 completed shifts: In: 18 [P.O.:480]  Out: 0     Lab Data:   All available labs and old records have been reviewed by me.     CBC:  Recent Labs     06/22/22  1015   WBC 8.3   RBC 4.59   HGB 12.5*   HCT 39.0*      MCV 85.0   MCH 27.3   MCHC 32.1   RDW 16.8*        BMP:  Recent Labs     06/22/22  1014      K 4.8      CO2 21   BUN 31*   CREATININE 0.9   CALCIUM 9.1   GLUCOSE 162*        Hepatic Function Panel:   Lab Results   Component Value Date    ALKPHOS 264 06/18/2022    ALT 10 06/18/2022    AST 13 06/18/2022    PROT 7.8 06/18/2022    BILITOT 0.3 06/18/2022    BILIDIR <0.2 06/18/2022    IBILI see below 06/18/2022 LABALBU 3.6 06/22/2022       CPK: No results found for: CKTOTAL  ESR: No results found for: SEDRATE  CRP: No results found for: CRP        Imaging: All pertinent images and reports for the current visit were reviewed by me during this visit. I reviewed the chest x-ray/CT scan/MRI images and independently interpreted the findings and results today. XR FOOT RIGHT (2 VIEWS)   Final Result   No osteomyelitis or acute osseous injury is identified. CT CHEST ABDOMEN PELVIS WO CONTRAST   Final Result   1. Focal rib deformity seen in the right chest adjacent to the anterior   costochondral junction related to nondisplaced rib fractures, involving the   3rd, 4th, 5th and 6th ribs. These may be related to subacute fractures,   though correlate with point tenderness. Of note, no adjacent parenchymal   abnormality in the lungs to suggest pulmonary contusion and acute injury. No   pneumothorax. 2. No other acute process seen within the chest, abdomen or pelvis. 3. Focal apical pleural-parenchymal suspected scarring at the left lung apex. 4. Atherosclerotic disease including coronary artery involvement, with   postoperative change from prior CABG. 5. Cholecystectomy. 6. Bladder wall thickening, which may be related to poor distention or   possibly cystitis. A small amount of intraluminal air may be related to   recent instrumentation. 7. Prostate enlargement. 8. Multilevel degenerative changes are seen throughout the thoracic and   lumbar spine, greatest at the level of L4-L5. CT HEAD WO CONTRAST   Final Result   No acute intracranial abnormality. Patchy white matter low attenuation with intracranial atherosclerotic disease   suggestive of chronic microvascular ischemic change. Medications: All current and past medications were reviewed.      doxycycline hyclate  100 mg Oral 2 times per day    miconazole   Topical BID    linezolid  600 mg Oral 2 times per day    aspirin 81 mg Oral Daily    atorvastatin  80 mg Oral Daily    clopidogrel  75 mg Oral Daily    DULoxetine  30 mg Oral BID    metoprolol succinate  50 mg Oral Daily    pregabalin  150 mg Oral BID    spironolactone  25 mg Oral Daily    sodium chloride flush  5-40 mL IntraVENous 2 times per day    enoxaparin  40 mg SubCUTAneous Daily    losartan  50 mg Oral BID    insulin glargine  20 Units SubCUTAneous Nightly    insulin lispro  0-12 Units SubCUTAneous TID WC    insulin lispro  0-6 Units SubCUTAneous Nightly    insulin lispro  5 Units SubCUTAneous TID WC    mupirocin   Topical Daily    nicotine  1 patch TransDERmal Daily        sodium chloride 25 mL (06/22/22 1601)    dextrose         diphenhydrAMINE, oxyCODONE-acetaminophen, diphenhydrAMINE, sodium chloride flush, sodium chloride, ondansetron **OR** ondansetron, polyethylene glycol, acetaminophen **OR** acetaminophen, dextrose bolus **OR** dextrose bolus, glucagon (rDNA), dextrose      Problem list:       Patient Active Problem List   Diagnosis Code    Right hallux infection L08.9    Open wound of right great toe S91.101A    Frequent falls R29.6    Closed fracture of multiple ribs of right side with routine healing S22.41XD    History of left above knee amputation ([facility] K87.911    Type 2 diabetes mellitus with right diabetic foot infection (HCC) E11.628, L08.9    Urinary tract infection without hematuria N39.0    Chronic indwelling Naylor catheter Z97.8    Pressure injury of skin of sacral region L89.159    Diabetic polyneuropathy associated with type 2 diabetes mellitus (HCC) E11.42    Essential hypertension I10    Coronary artery disease due to lipid rich plaque I25.10, I25.83    Gastroesophageal reflux disease without esophagitis K21.9    History of cholecystectomy Z90.49    MRSA (methicillin resistant Staphylococcus aureus) infection A49.02       Please note that this chart was generated using Dragon dictation software.  Although every effort was made to ensure the accuracy of this automated transcription, some errors in transcription may have occurred inadvertently. If you may need any clarification, please do not hesitate to contact me through EPIC or at the phone number provided below with my electronic signature. Any pictures or media included in this note were obtained after taking informed verbal consent from the patient and with their approval to include those in the patient's medical record.       Iris Nicole MD, MPH, HARRY Stevens  6/24/2022, 12:49 PM  [facility] Infectious Disease   [address] [place] [address], [address] [place] [address]  Office: [phone]  Fax: [phone]  Clinic days:  Tuesday & Thursday

## 2022-06-24 NOTE — PROGRESS NOTES
Discharge instructions given including to f/u w/ pcp, prescribed meds given to pt via pharmacy, peripheral iv removed, wound care instructions given to pt and wife, pt and wife verbalized understanding, discharged at 67.

## 2022-06-24 NOTE — CARE COORDINATION
Prior Authorization sent via CoverMyMeds    Medication:  linezolid 600mg qty of 20 for 10 days.      Key Code:  [id number]    Result: Approved    Electronically signed by Roberto Valles RN on 6/24/2022 at 12:21 PM

## 2022-06-24 NOTE — PROGRESS NOTES
CLINICAL PHARMACY NOTE: MEDS TO BEDS    Total # of Prescriptions Filled: 4   The following medications were delivered to the patient:  · Mupirocin oint  · Percocet 5-325  · Doxycycline 100 mg  · Zyvox 600 mg    Additional Documentation:    Delivered to Patient wife=signed  Ok to be delivered per [facility] Raymond Chávez CPhT

## 2022-07-05 ENCOUNTER — TELEPHONE (OUTPATIENT)
Dept: INFECTIOUS DISEASES | Age: 62
End: 2022-07-05

## 2022-07-05 NOTE — TELEPHONE ENCOUNTER
The Key associated with this PA has already been approved until 7/24/2022. Please let me know if this still needs to be completed. Thanks!

## (undated) DEVICE — BW-412T DISP COMBO CLEANING BRUSH: Brand: SINGLE USE COMBINATION CLEANING BRUSH

## (undated) DEVICE — PROCEDURE KIT ENDOSCP CUST

## (undated) DEVICE — MOUTHPIECE ENDOSCP L CTRL OPN AND SIDE PORTS DISP

## (undated) DEVICE — SOLUTION IV IRRIG WATER 500ML POUR BRL ST 2F7113

## (undated) DEVICE — FORCEPS BX L240CM WRK CHN 2.8MM STD CAP W/ NDL MIC MESH

## (undated) DEVICE — SET VLV 3 PC AWS DISPOSABLE GRDIAN SCOPEVALET